# Patient Record
Sex: FEMALE | Race: WHITE | Employment: FULL TIME | URBAN - METROPOLITAN AREA
[De-identification: names, ages, dates, MRNs, and addresses within clinical notes are randomized per-mention and may not be internally consistent; named-entity substitution may affect disease eponyms.]

---

## 2017-01-13 ENCOUNTER — TELEPHONE (OUTPATIENT)
Dept: FAMILY MEDICINE | Facility: CLINIC | Age: 52
End: 2017-01-13

## 2017-01-13 DIAGNOSIS — N89.8 VAGINAL DISCHARGE: Primary | ICD-10-CM

## 2017-01-13 RX ORDER — METRONIDAZOLE 500 MG/1
500 TABLET ORAL 2 TIMES DAILY
Qty: 14 TABLET | Refills: 0 | Status: SHIPPED | OUTPATIENT
Start: 2017-01-13 | End: 2017-05-26

## 2017-01-13 NOTE — TELEPHONE ENCOUNTER
Pt called and left msg that was unclear, states that she doesn't think her infection went away and thinks she needs med's again, please call to triage. She did not state what kind of infection .     Huma Earl, [place]

## 2017-01-13 NOTE — TELEPHONE ENCOUNTER
"Spoke with patient regarding symptoms.   Was treated on 12/2/2016 for bacterial vaginosis. Patient states that \"I am still red and sore around my vagina and there is still a little bit of discharge.\" Stated that there was pain with intercourse. Denies fever. Denies pain with urination.  Routing to provider to see if patient should be seen again or if more antibiotics are needed.    Luciana Shipley RN    "

## 2017-05-01 DIAGNOSIS — R94.5 ABNORMAL RESULTS OF LIVER FUNCTION STUDIES: Primary | ICD-10-CM

## 2017-05-02 DIAGNOSIS — B18.2 CHRONIC HEPATITIS C WITHOUT HEPATIC COMA (H): Primary | ICD-10-CM

## 2017-05-15 ENCOUNTER — OFFICE VISIT (OUTPATIENT)
Dept: GASTROENTEROLOGY | Facility: CLINIC | Age: 52
End: 2017-05-15
Attending: NURSE PRACTITIONER
Payer: COMMERCIAL

## 2017-05-15 VITALS
TEMPERATURE: 98.5 F | OXYGEN SATURATION: 98 % | WEIGHT: 155.4 LBS | HEIGHT: 64 IN | DIASTOLIC BLOOD PRESSURE: 66 MMHG | HEART RATE: 57 BPM | BODY MASS INDEX: 26.53 KG/M2 | SYSTOLIC BLOOD PRESSURE: 122 MMHG

## 2017-05-15 DIAGNOSIS — B18.2 CHRONIC HEPATITIS C WITHOUT HEPATIC COMA (H): Primary | ICD-10-CM

## 2017-05-15 DIAGNOSIS — R94.5 ABNORMAL RESULTS OF LIVER FUNCTION STUDIES: ICD-10-CM

## 2017-05-15 DIAGNOSIS — B18.2 CHRONIC HEPATITIS C WITHOUT HEPATIC COMA (H): ICD-10-CM

## 2017-05-15 LAB
ALBUMIN SERPL-MCNC: 3.8 G/DL (ref 3.4–5)
ALP SERPL-CCNC: 147 U/L (ref 40–150)
ALT SERPL W P-5'-P-CCNC: 297 U/L (ref 0–50)
ANION GAP SERPL CALCULATED.3IONS-SCNC: 7 MMOL/L (ref 3–14)
AST SERPL W P-5'-P-CCNC: 201 U/L (ref 0–45)
BILIRUB DIRECT SERPL-MCNC: 0.2 MG/DL (ref 0–0.2)
BILIRUB SERPL-MCNC: 0.6 MG/DL (ref 0.2–1.3)
BUN SERPL-MCNC: 18 MG/DL (ref 7–30)
CALCIUM SERPL-MCNC: 8.9 MG/DL (ref 8.5–10.1)
CHLORIDE SERPL-SCNC: 105 MMOL/L (ref 94–109)
CO2 SERPL-SCNC: 27 MMOL/L (ref 20–32)
CREAT SERPL-MCNC: 0.61 MG/DL (ref 0.52–1.04)
ERYTHROCYTE [DISTWIDTH] IN BLOOD BY AUTOMATED COUNT: 12.4 % (ref 10–15)
FERRITIN SERPL-MCNC: 711 NG/ML (ref 8–252)
GFR SERPL CREATININE-BSD FRML MDRD: NORMAL ML/MIN/1.7M2
GLUCOSE SERPL-MCNC: 90 MG/DL (ref 70–99)
HCT VFR BLD AUTO: 41.2 % (ref 35–47)
HGB BLD-MCNC: 14 G/DL (ref 11.7–15.7)
INR PPP: 1.01 (ref 0.86–1.14)
IRON SATN MFR SERPL: 32 % (ref 15–46)
IRON SERPL-MCNC: 140 UG/DL (ref 35–180)
MCH RBC QN AUTO: 33.8 PG (ref 26.5–33)
MCHC RBC AUTO-ENTMCNC: 34 G/DL (ref 31.5–36.5)
MCV RBC AUTO: 100 FL (ref 78–100)
PLATELET # BLD AUTO: 75 10E9/L (ref 150–450)
POTASSIUM SERPL-SCNC: 3.9 MMOL/L (ref 3.4–5.3)
PROT SERPL-MCNC: 7 G/DL (ref 6.8–8.8)
RBC # BLD AUTO: 4.14 10E12/L (ref 3.8–5.2)
SODIUM SERPL-SCNC: 139 MMOL/L (ref 133–144)
TIBC SERPL-MCNC: 430 UG/DL (ref 240–430)
WBC # BLD AUTO: 4 10E9/L (ref 4–11)

## 2017-05-15 PROCEDURE — 82728 ASSAY OF FERRITIN: CPT | Performed by: NURSE PRACTITIONER

## 2017-05-15 PROCEDURE — 80076 HEPATIC FUNCTION PANEL: CPT | Performed by: NURSE PRACTITIONER

## 2017-05-15 PROCEDURE — 91200 LIVER ELASTOGRAPHY: CPT | Mod: ZF

## 2017-05-15 PROCEDURE — 36415 COLL VENOUS BLD VENIPUNCTURE: CPT | Performed by: NURSE PRACTITIONER

## 2017-05-15 PROCEDURE — 80048 BASIC METABOLIC PNL TOTAL CA: CPT | Performed by: NURSE PRACTITIONER

## 2017-05-15 PROCEDURE — 83540 ASSAY OF IRON: CPT | Performed by: NURSE PRACTITIONER

## 2017-05-15 PROCEDURE — 99212 OFFICE O/P EST SF 10 MIN: CPT | Mod: ZF

## 2017-05-15 PROCEDURE — 85610 PROTHROMBIN TIME: CPT | Performed by: NURSE PRACTITIONER

## 2017-05-15 PROCEDURE — 85027 COMPLETE CBC AUTOMATED: CPT | Performed by: NURSE PRACTITIONER

## 2017-05-15 PROCEDURE — 83550 IRON BINDING TEST: CPT | Performed by: NURSE PRACTITIONER

## 2017-05-15 ASSESSMENT — PAIN SCALES - GENERAL: PAINLEVEL: NO PAIN (0)

## 2017-05-15 NOTE — MR AVS SNAPSHOT
After Visit Summary   5/15/2017    Parisa Wong    MRN: [id number]           Patient Information     Date Of Birth          1965        Visit Information        Provider Department      5/15/2017 1:00 PM Ganesh Wright NP [facility]        Today's Diagnoses     Chronic hepatitis C without hepatic coma (H)    -  1      Care Instructions    Keep up the excellent lifestyle changes as you are.    Abdominal ultrasound in 1-2 months.    Follow up appointment with Dr So to review lower platelet count and discuss Hepatitis C treatment options (Zepatier and Sofosbuvir x 12 weeks?).    If you have any questions or concerns about your last test results or plan of care, please call our clinic at [phone].           Follow-ups after your visit        Your next 10 appointments already scheduled     Jun 21, 2017 10:30 AM CDT   US ABDOMEN COMPLETE with UCUS2   [facility] ([facility])    [address] [place] [address] [phone]           Please bring a list of your medicines (including vitamins, minerals and over-the-counter drugs). Also, tell your doctor about any allergies you may have. Wear comfortable clothes and leave your valuables at home.  Adults: No eating or drinking for 8 hours before the exam. You may take medicine with a small sip of water.  Children: - Children 6+ years: No food or drink for 6 hours before exam. - Children 1-5 years: No food or drink for 4 hours before exam. - Infants, breast-fed: may have breast milk up to 2 hours before exam. - Infants, formula: may have bottle until 4 hours before exam.  Please call the Imaging Department at your exam site with any questions.            Aug 07, 2017  1:00 PM CDT   Lab with  LAB   [facility] ([facility])    [address] [address] [phone]            Aug 07, 2017  2:00 PM CDT  "  (Arrive by 1:45 PM)   Return General Liver with David So MD   [facility] ([facility])    [address] [facility]  3rd Floor  [facility] [address] [phone]              Future tests that were ordered for you today     Open Future Orders        Priority Expected Expires Ordered    US abdomen complete Routine 8/13/2017 5/15/2018 5/15/2017            Who to contact     If you have questions or need follow up information about today's clinic visit or your schedule please contact [facility] directly at [phone].  Normal or non-critical lab and imaging results will be communicated to you by MyChart, letter or phone within 4 business days after the clinic has received the results. If you do not hear from us within 7 days, please contact the clinic through [employer] or phone. If you have a critical or abnormal lab result, we will notify you by phone as soon as possible.  Submit refill requests through [employer] or call your pharmacy and they will forward the refill request to us. Please allow 3 business days for your refill to be completed.          Additional Information About Your Visit        MyChart Information     [employer] gives you secure access to your electronic health record. If you see a primary care provider, you can also send messages to your care team and make appointments. If you have questions, please call your primary care clinic.  If you do not have a primary care provider, please call [phone] and they will assist you.        Care EveryWhere ID     This is your Care EveryWhere ID. This could be used by other organizations to access your [place] medical records  [id number]        Your Vitals Were     Pulse Temperature Height Pulse Oximetry BMI (Body Mass Index)       57 98.5  F (36.9  C) (Oral) 1.613 m (5' 3.5\") 98% 27.1 kg/m2        Blood Pressure from Last 3 Encounters:   05/15/17 122/66   12/02/16 122/70   11/15/16 128/87    Weight from Last 3 " Encounters:   05/15/17 70.5 kg (155 lb 6.4 oz)   12/02/16 74.1 kg (163 lb 6.4 oz)   11/15/16 77.1 kg (170 lb)               Primary Care Provider Office Phone # Fax #    Imani Bah RamosNELIA Fitzgerald VIGNESH [phone]       [address] [address]        Thank you!     Thank you for choosing [facility]  for your care. Our goal is always to provide you with excellent care. Hearing back from our patients is one way we can continue to improve our services. Please take a few minutes to complete the written survey that you may receive in the mail after your visit with us. Thank you!             Your Updated Medication List - Protect others around you: Learn how to safely use, store and throw away your medicines at www.disposemymeds.org.          This list is accurate as of: 5/15/17  2:05 PM.  Always use your most recent med list.                   Brand Name Dispense Instructions for use    ALEVE PO      1 TABLET EVERY 12 HOURS AS NEEDED       ibuprofen 200 MG tablet    ADVIL/MOTRIN     Take 200 mg by mouth every 4 hours as needed.       menthol (Topical Analgesic) 2.5% 2.5 % Gel topical gel    BENGAY VANISHIN SCENT     Apply topically every 6 hours as needed for moderate pain       methyl salicylate liquid      Apply topically as needed for moderate to severe pain (Max Freeze)       metroNIDAZOLE 500 MG tablet    FLAGYL    14 tablet    Take 1 tablet (500 mg) by mouth 2 times daily       * UNABLE TO FIND      MEDICATION NAME: Iodine, 3 drops on tongue once per day       * UNABLE TO FIND      MEDICATION NAME: Diatomaceous       * Notice:  This list has 2 medication(s) that are the same as other medications prescribed for you. Read the directions carefully, and ask your doctor or other care provider to review them with you.

## 2017-05-15 NOTE — LETTER
5/15/2017      RE: Parisa Wong  [address] [address] [facility] [address]       REASON FOR VISIT:  Followup hepatitis C infection.      HISTORY OF PRESENT ILLNESS:  Ms. Parisa Bullard is a 52-year-old  female who has a history of chronic hepatitis C infection.  She does have hepatitis C genotype 3A.  She was previously seen and followed at [facility].  Of note, most recently, she has established care with my colleague, Ms. Radha Thompson.  This is my first time meeting her.  Of note, she does indicate that she did do interferon 3 times weekly back the 1990s (95-99).  She does not think that she got any ribavirin back then.  According to Ms. Thompson's notes, she was a partial responder and did not have an undetectable virus level at the end of treatment.  She did undergo a liver biopsy in 2006 which showed stage 0-1 fibrosis.  No excess iron was noted.  She did undergo an MRE elastography study in 2006 which showed stage III to IV fibrosis.  She had been not 100% abstinent from alcohol and was advised to do so and return to the Liver Clinic in 6 months to discuss hepatitis C treatment.  This is what brings her in to clinic today.  She is pleased to report she has not had any alcohol since 11/14/2016.  It has not been difficult.  She was not drinking that much alcohol before then, per her report.  She does not have any problems with abdominal pain.  She occasionally has nausea but no vomiting.  She does not have any problems with heartburn.  Her appetite is decreased.  She has been working on trying to lose some weight and is pleased to report that she has been able to do so with the use of spices, as well as using iodine tablets.  She does not have any problems with diarrhea or constipation.  She indicates that just today she noticed a little bit of bright red blood in the stool.  She thinks she has hemorrhoids and has had in the past.  She was having a little more diarrhea today.   Most of the time, she has regular bowel movements.  She did undergo a colonoscopy in 2013 where several tubular adenomas were noted.  Per her report, she has to have a repeat colonoscopy 5 years later (2018).  She indicates she is not sleeping especially well.  She only gets around 4 hours of sleep at night.  Her energy level is fair.  She does not have any problems with depression but feels a little bit of memory/forgetfulness.  She thinks it might be related to not getting a very good quality of sleep.  She has been screened and is negative for previous hepatitis B infection.  She is HIV negative.  She does not currently have immunity to hepatitis B.  She has completed vaccinations for both hepatitis A and B in the past.  Both of her titers, however, are not showing current immunity.      PAST MEDICAL HEALTH HISTORY:  Significant for hepatitis C infection, report of hemorrhoids, colon polyps, back herniated disk, concussion.      PAST SURGICAL HISTORY:  Includes a back bone chip removal, back cyst removal, tonsillectomy.      SOCIAL HISTORY:  She is .  She has 2 children.  She does smoke.        FAMILY HEALTH HISTORY:  Her mother is living.  She has leukemia/uterine cancer.  She possibly might also have Crohn's disease.  She did not know her father.  She has 4 sisters and 1 brother.  She does not think they have any liver or GI issues, but she does indicate that some remote family member did have hemochromatosis H63D.  She has had iron tests, and these have been normal.  She  also did not have any iron noted on her liver biopsy.      ALLERGIES AND MEDICATIONS:  Listed in the electronic medical record.      PHYSICAL EXAMINATION:   GENERAL:  Ms. Wong is a pleasant middle-aged  female in no apparent distress.   VITAL SIGNS:  Blood pressure 122/66, heart rate 57 beats minute, temperature 98.5 orally, O2 sat 98% on room air, weight 155 pounds, BMI 27.   HEENT:  Anicteric sclerae.  Oropharynx is  clear.   NECK:  Supple, no lymphadenopathy.   PULMONARY:  Clear to auscultation.  No crackles or rales noted.   CARDIOVASCULAR:  S1, S2, regular rate and rhythm, no murmur or gallop appreciated.   ABDOMEN:  Soft, rounded, nontender to palpation.  No spleen tip palpable.  Liver size estimated at 1 fingerbreadth below the right costal margin.   EXTREMITIES:  Shows no clubbing, cyanosis or pitting edema.   NEUROLOGIC:  Grossly intact.   PSYCHIATRIC:  Normal speech, normal affect, normal memory.      LABORATORY:  Most recent laboratory tests results from today showed the following:  White count 4, hemoglobin 14, platelet count 75,000.  INR 1.05.  , , alkaline phosphatase 147, albumin 3.8, total bilirubin 0.6.  Electrolyte panel entirely normal.  Fibrosis scan from today shows it to be 8.9, which puts her in the early stage III fibrosis range.      ASSESSMENT:  Chronic hepatitis C, stage III fibrosis, thrombocytopenia.      PLAN:  At this time, I spent time talking with Ms. Wong about her current liver fibrosis, abnormal labs and hepatitis C retreatment options.  I do find it a little surprising to see that she has such significant thrombocytopenia with her stage III fibrosis.  The rest of her liver labs do not support a cirrhotic-appearing liver, and there is no mention of significant splenomegaly on imaging (spleen size measured 13.3 cm).  She might benefit from a review by Hematology.  I did talk with her about the newer hepatitis C medications.  She does have a genotype 3A.  I would treat her as though she has cirrhosis with her stage III fibrosis and lower platelet count.  We reviewed that a new hepatitis C treatment guidelines and see that she might be a candidate for zepatier and Epclusa once daily for 12 weeks.  She would prefer this to Epclusa and ribavirin, as she does not like to take a lot of pills.  I did let her know that I am leaving our practice on 06/09.  I would like to slow down  slightly and ask her to establish care with my colleague, Dr. David So, to review her thrombocytopenia (address the question of whether or not she would benefit from seeing Hematology, as it does seem slightly more abnormal than would be expected given her other labs and imaging).  I would also evaluate his input on her previous treatment experience with  Interferon 3 times weekly interferon minus ribavirin.  The current hepatitis C guidelines address previous pegylated interferon experience, and I am not 100% certain that that would also apply to some of the earlier interferon options and would value his input and thoughts on this for her.  I have added on iron tests to make sure that they are normal given her remote family history of hemochromatosis.  At this time, I have not advised additional vaccinations for hepatitis A and B, but she might benefit from redoing both series jointly but at a higher dose in the future.  I have asked her again to establish care with my colleague, Dr. So, to take over her liver care and hepatitis C retreatment plan.  She was congratulated on her commitment to sobriety.  I did advise that she complete an up-to-date abdominal ultrasound sometime in the next 1-2 months for HCC screening.  We did review the fact that stage III fibrosis individuals can get an HCC.  All of her questions were addressed.        Forty minutes were spent with this patient, greater than 50% spent in counseling and coordination of care.      cc: NELIA Mccabe, VIGNESH Wright NP

## 2017-05-15 NOTE — PATIENT INSTRUCTIONS
Keep up the excellent lifestyle changes as you are.    Abdominal ultrasound in 1-2 months.    Follow up appointment with Dr So to review lower platelet count and discuss Hepatitis C treatment options (Zepatier and Sofosbuvir x 12 weeks?).    If you have any questions or concerns about your last test results or plan of care, please call our clinic at [phone].

## 2017-05-15 NOTE — PROGRESS NOTES
REASON FOR VISIT:  Followup hepatitis C infection.      HISTORY OF PRESENT ILLNESS:  Ms. Parisa Bullard is a 52-year-old  female who has a history of chronic hepatitis C infection.  She does have hepatitis C genotype 3A.  She was previously seen and followed at [facility].  Of note, most recently, she has established care with my colleague, Ms. Radha Thompson.  This is my first time meeting her.  Of note, she does indicate that she did do interferon 3 times weekly back the 1990s (95-99).  She does not think that she got any ribavirin back then.  According to Ms. Thompson's notes, she was a partial responder and did not have an undetectable virus level at the end of treatment.  She did undergo a liver biopsy in 2006 which showed stage 0-1 fibrosis.  No excess iron was noted.  She did undergo an MRE elastography study in 2006 which showed stage III to IV fibrosis.  She had been not 100% abstinent from alcohol and was advised to do so and return to the Liver Clinic in 6 months to discuss hepatitis C treatment.  This is what brings her in to clinic today.  She is pleased to report she has not had any alcohol since 11/14/2016.  It has not been difficult.  She was not drinking that much alcohol before then, per her report.  She does not have any problems with abdominal pain.  She occasionally has nausea but no vomiting.  She does not have any problems with heartburn.  Her appetite is decreased.  She has been working on trying to lose some weight and is pleased to report that she has been able to do so with the use of spices, as well as using iodine tablets.  She does not have any problems with diarrhea or constipation.  She indicates that just today she noticed a little bit of bright red blood in the stool.  She thinks she has hemorrhoids and has had in the past.  She was having a little more diarrhea today.  Most of the time, she has regular bowel movements.  She did undergo a colonoscopy  in 2013 where several tubular adenomas were noted.  Per her report, she has to have a repeat colonoscopy 5 years later (2018).  She indicates she is not sleeping especially well.  She only gets around 4 hours of sleep at night.  Her energy level is fair.  She does not have any problems with depression but feels a little bit of memory/forgetfulness.  She thinks it might be related to not getting a very good quality of sleep.  She has been screened and is negative for previous hepatitis B infection.  She is HIV negative.  She does not currently have immunity to hepatitis B.  She has completed vaccinations for both hepatitis A and B in the past.  Both of her titers, however, are not showing current immunity.      PAST MEDICAL HEALTH HISTORY:  Significant for hepatitis C infection, report of hemorrhoids, colon polyps, back herniated disk, concussion.      PAST SURGICAL HISTORY:  Includes a back bone chip removal, back cyst removal, tonsillectomy.      SOCIAL HISTORY:  She is .  She has 2 children.  She does smoke.        FAMILY HEALTH HISTORY:  Her mother is living.  She has leukemia/uterine cancer.  She possibly might also have Crohn's disease.  She did not know her father.  She has 4 sisters and 1 brother.  She does not think they have any liver or GI issues, but she does indicate that some remote family member did have hemochromatosis H63D.  She has had iron tests, and these have been normal.  She  also did not have any iron noted on her liver biopsy.      ALLERGIES AND MEDICATIONS:  Listed in the electronic medical record.      PHYSICAL EXAMINATION:   GENERAL:  Ms. Wong is a pleasant middle-aged  female in no apparent distress.   VITAL SIGNS:  Blood pressure 122/66, heart rate 57 beats minute, temperature 98.5 orally, O2 sat 98% on room air, weight 155 pounds, BMI 27.   HEENT:  Anicteric sclerae.  Oropharynx is clear.   NECK:  Supple, no lymphadenopathy.   PULMONARY:  Clear to auscultation.  No  crackles or rales noted.   CARDIOVASCULAR:  S1, S2, regular rate and rhythm, no murmur or gallop appreciated.   ABDOMEN:  Soft, rounded, nontender to palpation.  No spleen tip palpable.  Liver size estimated at 1 fingerbreadth below the right costal margin.   EXTREMITIES:  Shows no clubbing, cyanosis or pitting edema.   NEUROLOGIC:  Grossly intact.   PSYCHIATRIC:  Normal speech, normal affect, normal memory.      LABORATORY:  Most recent laboratory tests results from today showed the following:  White count 4, hemoglobin 14, platelet count 75,000.  INR 1.05.  , , alkaline phosphatase 147, albumin 3.8, total bilirubin 0.6.  Electrolyte panel entirely normal.  Fibrosis scan from today shows it to be 8.9, which puts her in the early stage III fibrosis range.      ASSESSMENT:  Chronic hepatitis C, stage III fibrosis, thrombocytopenia.      PLAN:  At this time, I spent time talking with Ms. Wong about her current liver fibrosis, abnormal labs and hepatitis C retreatment options.  I do find it a little surprising to see that she has such significant thrombocytopenia with her stage III fibrosis.  The rest of her liver labs do not support a cirrhotic-appearing liver, and there is no mention of significant splenomegaly on imaging (spleen size measured 13.3 cm).  She might benefit from a review by Hematology.  I did talk with her about the newer hepatitis C medications.  She does have a genotype 3A.  I would treat her as though she has cirrhosis with her stage III fibrosis and lower platelet count.  We reviewed that a new hepatitis C treatment guidelines and see that she might be a candidate for zepatier and Epclusa once daily for 12 weeks.  She would prefer this to Epclusa and ribavirin, as she does not like to take a lot of pills.  I did let her know that I am leaving our practice on 06/09.  I would like to slow down slightly and ask her to establish care with my colleague, Dr. David So, to review her  thrombocytopenia (address the question of whether or not she would benefit from seeing Hematology, as it does seem slightly more abnormal than would be expected given her other labs and imaging).  I would also evaluate his input on her previous treatment experience with  Interferon 3 times weekly interferon minus ribavirin.  The current hepatitis C guidelines address previous pegylated interferon experience, and I am not 100% certain that that would also apply to some of the earlier interferon options and would value his input and thoughts on this for her.  I have added on iron tests to make sure that they are normal given her remote family history of hemochromatosis.  At this time, I have not advised additional vaccinations for hepatitis A and B, but she might benefit from redoing both series jointly but at a higher dose in the future.  I have asked her again to establish care with my colleague, Dr. So, to take over her liver care and hepatitis C retreatment plan.  She was congratulated on her commitment to sobriety.  I did advise that she complete an up-to-date abdominal ultrasound sometime in the next 1-2 months for HCC screening.  We did review the fact that stage III fibrosis individuals can get an HCC.  All of her questions were addressed.        Forty minutes were spent with this patient, greater than 50% spent in counseling and coordination of care.      cc: NELIA Mccabe, CNP

## 2017-05-15 NOTE — NURSING NOTE
Chief Complaint   Patient presents with     RECHECK     Hepatitis C   Pt roomed, vitals, meds, and allergies reviewed with pt. Pt ready for provider.  Aryan Holley, CMA

## 2017-05-26 ENCOUNTER — OFFICE VISIT (OUTPATIENT)
Dept: FAMILY MEDICINE | Facility: CLINIC | Age: 52
End: 2017-05-26
Payer: COMMERCIAL

## 2017-05-26 VITALS
SYSTOLIC BLOOD PRESSURE: 124 MMHG | HEART RATE: 64 BPM | HEIGHT: 64 IN | DIASTOLIC BLOOD PRESSURE: 80 MMHG | WEIGHT: 151 LBS | BODY MASS INDEX: 25.78 KG/M2 | TEMPERATURE: 98.9 F

## 2017-05-26 DIAGNOSIS — R74.8 ELEVATED LIVER ENZYMES: ICD-10-CM

## 2017-05-26 DIAGNOSIS — B96.89 BV (BACTERIAL VAGINOSIS): ICD-10-CM

## 2017-05-26 DIAGNOSIS — N76.0 BV (BACTERIAL VAGINOSIS): ICD-10-CM

## 2017-05-26 DIAGNOSIS — N89.8 VAGINAL DISCHARGE: Primary | ICD-10-CM

## 2017-05-26 LAB
MICRO REPORT STATUS: ABNORMAL
SPECIMEN SOURCE: ABNORMAL
WET PREP SPEC: ABNORMAL

## 2017-05-26 PROCEDURE — 99213 OFFICE O/P EST LOW 20 MIN: CPT | Performed by: NURSE PRACTITIONER

## 2017-05-26 PROCEDURE — 87491 CHLMYD TRACH DNA AMP PROBE: CPT | Performed by: NURSE PRACTITIONER

## 2017-05-26 PROCEDURE — 87591 N.GONORRHOEAE DNA AMP PROB: CPT | Performed by: NURSE PRACTITIONER

## 2017-05-26 PROCEDURE — 87210 SMEAR WET MOUNT SALINE/INK: CPT | Performed by: NURSE PRACTITIONER

## 2017-05-26 RX ORDER — METRONIDAZOLE 500 MG/1
500 TABLET ORAL 2 TIMES DAILY
Qty: 42 TABLET | Refills: 0 | Status: SHIPPED | OUTPATIENT
Start: 2017-05-26 | End: 2017-07-18

## 2017-05-26 NOTE — NURSING NOTE
"Chief Complaint   Patient presents with     Liver       Initial /80  Pulse 64  Temp 98.9  F (37.2  C) (Tympanic)  Ht 5' 3.5\" (1.613 m)  Wt 151 lb (68.5 kg)  BMI 26.33 kg/m2 Estimated body mass index is 26.33 kg/(m^2) as calculated from the following:    Height as of this encounter: 5' 3.5\" (1.613 m).    Weight as of this encounter: 151 lb (68.5 kg).  Medication Reconciliation: complete  "

## 2017-05-26 NOTE — PATIENT INSTRUCTIONS
You do have a bacterial vaginosis   Please continue with oral vinegar    Get a 1 time  Vinegar vaginal douche      use the Flagyl times 1 week off for 2 then repeat x2

## 2017-05-26 NOTE — MR AVS SNAPSHOT
After Visit Summary   5/26/2017    Parisa Wong    MRN: [id number]           Patient Information     Date Of Birth          1965        Visit Information        Provider Department      5/26/2017 7:20 AM Imani Puentes APRN [facility]        Today's Diagnoses     Vaginal discharge    -  1    Elevated liver enzymes        BV (bacterial vaginosis)          Care Instructions    You do have a bacterial vaginosis   Please continue with oral vinegar    Get a 1 time  Vinegar vaginal douche      use the Flagyl times 1 week off for 2 then repeat x2             Follow-ups after your visit        Your next 10 appointments already scheduled     Jun 21, 2017 10:30 AM CDT   US ABDOMEN COMPLETE with US[address] [place] [address] Imaging Center US ([facility])    [address] [place] [address] [phone]           Please bring a list of your medicines (including vitamins, minerals and over-the-counter drugs). Also, tell your doctor about any allergies you may have. Wear comfortable clothes and leave your valuables at home.  Adults: No eating or drinking for 8 hours before the exam. You may take medicine with a small sip of water.  Children: - Children 6+ years: No food or drink for 6 hours before exam. - Children 1-5 years: No food or drink for 4 hours before exam. - Infants, breast-fed: may have breast milk up to 2 hours before exam. - Infants, formula: may have bottle until 4 hours before exam.  Please call the Imaging Department at your exam site with any questions.            Aug 07, 2017  1:00 PM CDT   Lab with  LAB   [facility] ([facility])    [address] [place] [address] [phone]            Aug 07, 2017  2:00 PM CDT   (Arrive by 1:45 PM)   Return General Liver with David So MD   [facility] Hepatology ([facility])    [address] [place] [address]  "  3rd [facility] [id number] [phone]              Who to contact     Normal or non-critical lab and imaging results will be communicated to you by [employer], letter or phone within 4 business days after the clinic has received the results. If you do not hear from us within 7 days, please contact the clinic through [employer] or phone. If you have a critical or abnormal lab result, we will notify you by phone as soon as possible.  Submit refill requests through [employer] or call your pharmacy and they will forward the refill request to us. Please allow 3 business days for your refill to be completed.          If you need to speak with a  for additional information , please call: [phone]           Additional Information About Your Visit        [employer] Information     [employer] gives you secure access to your electronic health record. If you see a primary care provider, you can also send messages to your care team and make appointments. If you have questions, please call your primary care clinic.  If you do not have a primary care provider, please call [phone] and they will assist you.        Care EveryWhere ID     This is your Care EveryWhere ID. This could be used by other organizations to access your [place] medical records  [id number]        Your Vitals Were     Pulse Temperature Height BMI (Body Mass Index)          64 98.9  F (37.2  C) (Tympanic) 5' 3.5\" (1.613 m) 26.33 kg/m2         Blood Pressure from Last 3 Encounters:   05/26/17 124/80   05/15/17 122/66   12/02/16 122/70    Weight from Last 3 Encounters:   05/26/17 151 lb (68.5 kg)   05/15/17 155 lb 6.4 oz (70.5 kg)   12/02/16 163 lb 6.4 oz (74.1 kg)              We Performed the Following     Chlamydia trachomatis PCR     Neisseria gonorrhoeae PCR     Wet prep          Today's Medication Changes          These changes are accurate as of: 5/26/17  8:37 AM.  If you have any questions, ask your nurse or doctor.          "      These medicines have changed or have updated prescriptions.        Dose/Directions    metroNIDAZOLE 500 MG tablet   Commonly known as:  FLAGYL   This may have changed:  additional instructions   Used for:  Vaginal discharge, BV (bacterial vaginosis)   Changed by:  Imani Puentes APRN CNP        Dose:  500 mg   Take 1 tablet (500 mg) by mouth 2 times daily Take for  7 days off for 2 weeks and repeat. x2   Quantity:  42 tablet   Refills:  0         Stop taking these medicines if you haven't already. Please contact your care team if you have questions.     ALEVE PO   Stopped by:  Imani Puentes APRN CNP           ibuprofen 200 MG tablet   Commonly known as:  ADVIL/MOTRIN   Stopped by:  Imani Puentes APRN CNP           menthol (Topical Analgesic) 2.5% 2.5 % Gel topical gel   Commonly known as:  BENGAY VANISHIN SCENT   Stopped by:  Imani Puentes APRN CNP                Where to get your medicines      These medications were sent to [address] [address]     Phone:  [phone]     metroNIDAZOLE 500 MG tablet                Primary Care Provider Office Phone # Fax #    NELIA Hugo [phone]       [address] [address]        Thank you!     Thank you for choosing [facility]  for your care. Our goal is always to provide you with excellent care. Hearing back from our patients is one way we can continue to improve our services. Please take a few minutes to complete the written survey that you may receive in the mail after your visit with us. Thank you!             Your Updated Medication List - Protect others around you: Learn how to safely use, store and throw away your medicines at www.disposemymeds.org.          This list is accurate as of: 5/26/17  8:37 AM.  Always use your most recent med list.                    Brand Name Dispense Instructions for use    methyl salicylate liquid      Apply topically as needed for moderate to severe pain (Max Freeze)       metroNIDAZOLE 500 MG tablet    FLAGYL    42 tablet    Take 1 tablet (500 mg) by mouth 2 times daily Take for  7 days off for 2 weeks and repeat. x2       * UNABLE TO FIND      MEDICATION NAME: Iodine, 3 drops on tongue once per day       * UNABLE TO FIND      MEDICATION NAME: Diatomaceous       * Notice:  This list has 2 medication(s) that are the same as other medications prescribed for you. Read the directions carefully, and ask your doctor or other care provider to review them with you.

## 2017-05-26 NOTE — PROGRESS NOTES
SUBJECTIVE:                                                    Parisa Wong is a 52 year old female who presents to clinic today for the following health issues:    - Review labs completed 5/15/2017. PMHx chronic hepatitis C without coma.    - Check skin.    Vaginal Symptoms     Onset: 1 year    Description:  Vaginal Discharge: white   Itching (Pruritis): YES- along elastic underwear line  Burning sensation:  no   Odor: no     Accompanying Signs & Symptoms:  Pain with Urination: no   Abdominal Pain: no   Fever: no    History:   Sexually active: no   New Partner: no   Possibility of Pregnancy:  No    Precipitating factors:   Recent Antibiotic Use: no     Alleviating factors:  None   Therapies Tried and outcome: None    Is doing great  !!! After drinking her coffee with  Cinnamon, nutmeg, turmeric coconut,  Tumeric  And she is having NO pain. And with the weight loss she si feeling better.   She quit drinking as well.      Problem list and histories reviewed & adjusted, as indicated.  Additional history: as documented    Patient Active Problem List   Diagnosis     GOITER UNINODULAR, NONTOXIC     ENLARGED UTERUS     CARDIOVASCULAR SCREENING; LDL GOAL LESS THAN 160     24 hour contact handout given     Memory difficulty     Elevated liver enzymes     Vitamin D deficiency     Colon polyps     Obese     Knee pain     SI (sacroiliac) joint inflammation (H)     Lumbar spinal stenosis     Thumb pain     Chronic hepatitis C without hepatic coma (H)     Degenerative arthritis of thumb     Past Surgical History:   Procedure Laterality Date     COLONOSCOPY  1/29/2014    Procedure: COMBINED COLONOSCOPY, SINGLE BIOPSY/POLYPECTOMY BY BIOPSY;  Gastroscopy;  Surgeon: Prashanth Lin MD;  Location: [facility]     D & C  6-     HC ABLATION, ENDOMETRIAL, THERMAL, W/O HYSTEROSCOPIC GUIDANCE  6-    novasure     SURGICAL HISTORY OF -   1979    tonsillectomy and adenoidectomy     SURGICAL HISTORY OF -       discectomy  L5, S1       Social History   Substance Use Topics     Smoking status: Former Smoker     Types: Cigars     Smokeless tobacco: Never Used      Comment: son and huband smoke in the house     Alcohol use 2.4 - 3.0 oz/week     4 - 5 Cans of beer per week      Comment: none since november     Family History   Problem Relation Age of Onset     CANCER Maternal Grandmother      throat     Alcohol/Drug Maternal Grandfather      alcohol     Neurologic Disorder Daughter      cerebral palsy     GASTROINTESTINAL DISEASE Mother      Divertic     Gynecology Mother      Hysterectomy     CANCER Mother      Leukemia     Breast Cancer Maternal Aunt      Unknown/Adopted Father      Cancer - colorectal No family hx of      Prostate Cancer No family hx of      C.A.D. No family hx of          Current Outpatient Prescriptions   Medication Sig Dispense Refill     UNABLE TO FIND MEDICATION NAME: Diatomaceous       UNABLE TO FIND MEDICATION NAME: Iodine, 3 drops on tongue once per day       methyl salicylate liquid Apply topically as needed for moderate to severe pain (Max Freeze)        Allergies   Allergen Reactions     Codeine      Sulfa Drugs      BP Readings from Last 3 Encounters:   05/26/17 124/80   05/15/17 122/66   12/02/16 122/70    Wt Readings from Last 3 Encounters:   05/26/17 151 lb (68.5 kg)   05/15/17 155 lb 6.4 oz (70.5 kg)   12/02/16 163 lb 6.4 oz (74.1 kg)                    Reviewed and updated as needed this visit by clinical staff       Reviewed and updated as needed this visit by Provider         ROS:  CONSTITUTIONAL:POSITIVE  for weight loss with diet changes, quit drinking ,  See note above.   E/M: NEGATIVE for ear, mouth and throat problems  R: NEGATIVE for significant cough or SOB  CV: NEGATIVE for chest pain, palpitations or peripheral edema  GI: NEGATIVE for nausea, abdominal pain, heartburn, or change in bowel habits  : vaginal discharge for 1 year.  Will have intermittent itching,    PSYCHIATRIC: NEGATIVE for  "changes in mood or affect    OBJECTIVE:                                                    /80  Pulse 64  Temp 98.9  F (37.2  C) (Tympanic)  Ht 5' 3.5\" (1.613 m)  Wt 151 lb (68.5 kg)  BMI 26.33 kg/m2  Body mass index is 26.33 kg/(m^2).  GENERAL: healthy, alert and no distress  NECK: no adenopathy, no asymmetry, masses, or scars and thyroid normal to palpation  RESP: lungs clear to auscultation - no rales, rhonchi or wheezes  CV: regular rate and rhythm, normal S1 S2, no S3 or S4, no murmur, click or rub, no peripheral edema and peripheral pulses strong  ABDOMEN: soft, nontender, no hepatosplenomegaly, no masses and bowel sounds normal   there is normal appearing vaginal discharge.   MS: no gross musculoskeletal defects noted, no edema    Diagnostic Test Results:  Results for orders placed or performed in visit on 05/26/17 (from the past 24 hour(s))   Wet prep   Result Value Ref Range    Specimen Description Vagina     Wet Prep (A)      Many Clue cells seen  No yeast seen  No Trichomonas seen  Many WBC'S seen      Micro Report Status FINAL 05/26/2017         ASSESSMENT/PLAN:                                                         ASSESSMENT/PLAN:      ICD-10-CM    1. Vaginal discharge N89.8 Wet prep     Neisseria gonorrhoeae PCR     Chlamydia trachomatis PCR     metroNIDAZOLE (FLAGYL) 500 MG tablet   2. Elevated liver enzymes R74.8    3. BV (bacterial vaginosis) N76.0 metroNIDAZOLE (FLAGYL) 500 MG tablet    B96.89        Patient Instructions   You do have a bacterial vaginosis   Please continue with oral vinegar    Get a 1 time  Vinegar vaginal douche      use the Flagyl times 1 week off for 2 then repeat x2                   MEDICATIONS:        - Start taking Flagyl       - Continue other medications without change  See Patient Instructions    ANGE CARPENTER NP, APRN [facility]    "

## 2017-05-28 LAB
C TRACH DNA SPEC QL NAA+PROBE: NORMAL
N GONORRHOEA DNA SPEC QL NAA+PROBE: NORMAL
SPECIMEN SOURCE: NORMAL
SPECIMEN SOURCE: NORMAL

## 2017-06-06 DIAGNOSIS — B18.2 CHRONIC HEPATITIS C WITHOUT HEPATIC COMA (H): Primary | ICD-10-CM

## 2017-06-22 ENCOUNTER — TELEPHONE (OUTPATIENT)
Dept: FAMILY MEDICINE | Facility: CLINIC | Age: 52
End: 2017-06-22

## 2017-06-22 NOTE — TELEPHONE ENCOUNTER
Reason for Call: Request for an order or referral:    Order or referral being requested: Blood Work and Tetnus    Date needed: by next week    Has the patient been seen by the PCP for this problem? unsure    Additional comments: She said that H63S runs in her family.  She would like orders for a blood test for this. She sees the specialist on 7/8-18/2017.  She would like a tetanus shot too.  Please advise.    Phone number Patient can be reached at:  Home number on file [phone] (home)    Best Time:  any    Can we leave a detailed message on this number?  YES    Call taken on 6/22/2017 at 9:30 AM by Alexus Eduardo

## 2017-06-26 NOTE — TELEPHONE ENCOUNTER
Could you call her back.  I don't know what H63S is   Who in her family has that diagnosis    It is a genetic test that she would have to sign a form stating that she will pay for the test if her insurance doesn't      What would she do with the information from the test results     She can make a nurse only appointment to update her tetanus or go to the pharmacy

## 2017-06-27 NOTE — TELEPHONE ENCOUNTER
I spoke to Parisa. She said the H63S test is a test to find out if there is a genetic cause for the body not to absorb iron like it should so it attacks the liver and other organs.She says her maternal Grandfather had this gene as well as an Aunt and cousins.    I let her know that in most cases a specialist orders these kinds of tests. She says she has an appointment with one in July and wanted to have the results for him when she goes. Do you want him to send orders for any tests she might need or are you ok with ordering it? I let her know she can do the tetanus here or in pharmacy. Haylee Pina RN

## 2017-06-28 NOTE — TELEPHONE ENCOUNTER
The test code H63S doesn't come up on the computer ordering.    She may need to call the specialist to see if they can order it plus the results will then go to them   Thanks   Imani

## 2017-07-18 ENCOUNTER — OFFICE VISIT (OUTPATIENT)
Dept: GASTROENTEROLOGY | Facility: CLINIC | Age: 52
End: 2017-07-18
Attending: INTERNAL MEDICINE
Payer: COMMERCIAL

## 2017-07-18 VITALS
SYSTOLIC BLOOD PRESSURE: 109 MMHG | DIASTOLIC BLOOD PRESSURE: 68 MMHG | TEMPERATURE: 98.2 F | OXYGEN SATURATION: 98 % | HEART RATE: 70 BPM | WEIGHT: 148.4 LBS | BODY MASS INDEX: 25.33 KG/M2 | HEIGHT: 64 IN

## 2017-07-18 DIAGNOSIS — B18.2 CHRONIC HEPATITIS C WITHOUT HEPATIC COMA (H): Primary | ICD-10-CM

## 2017-07-18 DIAGNOSIS — B18.2 CHRONIC HEPATITIS C WITHOUT HEPATIC COMA (H): ICD-10-CM

## 2017-07-18 LAB
ALBUMIN SERPL-MCNC: 3.8 G/DL (ref 3.4–5)
ALP SERPL-CCNC: 154 U/L (ref 40–150)
ALT SERPL W P-5'-P-CCNC: 486 U/L (ref 0–50)
ANION GAP SERPL CALCULATED.3IONS-SCNC: 6 MMOL/L (ref 3–14)
AST SERPL W P-5'-P-CCNC: 407 U/L (ref 0–45)
BILIRUB DIRECT SERPL-MCNC: 0.5 MG/DL (ref 0–0.2)
BILIRUB SERPL-MCNC: 1.1 MG/DL (ref 0.2–1.3)
BUN SERPL-MCNC: 16 MG/DL (ref 7–30)
CALCIUM SERPL-MCNC: 9.2 MG/DL (ref 8.5–10.1)
CHLORIDE SERPL-SCNC: 106 MMOL/L (ref 94–109)
CO2 SERPL-SCNC: 27 MMOL/L (ref 20–32)
CREAT SERPL-MCNC: 0.68 MG/DL (ref 0.52–1.04)
ERYTHROCYTE [DISTWIDTH] IN BLOOD BY AUTOMATED COUNT: 12.3 % (ref 10–15)
GFR SERPL CREATININE-BSD FRML MDRD: NORMAL ML/MIN/1.7M2
GLUCOSE SERPL-MCNC: 95 MG/DL (ref 70–99)
HCT VFR BLD AUTO: 41.5 % (ref 35–47)
HGB BLD-MCNC: 14.4 G/DL (ref 11.7–15.7)
INR PPP: 1.09 (ref 0.86–1.14)
MCH RBC QN AUTO: 34.1 PG (ref 26.5–33)
MCHC RBC AUTO-ENTMCNC: 34.7 G/DL (ref 31.5–36.5)
MCV RBC AUTO: 98 FL (ref 78–100)
PLATELET # BLD AUTO: 60 10E9/L (ref 150–450)
POTASSIUM SERPL-SCNC: 4 MMOL/L (ref 3.4–5.3)
PROT SERPL-MCNC: 7 G/DL (ref 6.8–8.8)
RBC # BLD AUTO: 4.22 10E12/L (ref 3.8–5.2)
SODIUM SERPL-SCNC: 138 MMOL/L (ref 133–144)
WBC # BLD AUTO: 3.3 10E9/L (ref 4–11)

## 2017-07-18 PROCEDURE — 85610 PROTHROMBIN TIME: CPT | Performed by: INTERNAL MEDICINE

## 2017-07-18 PROCEDURE — 80076 HEPATIC FUNCTION PANEL: CPT | Performed by: INTERNAL MEDICINE

## 2017-07-18 PROCEDURE — 80048 BASIC METABOLIC PNL TOTAL CA: CPT | Performed by: INTERNAL MEDICINE

## 2017-07-18 PROCEDURE — 36415 COLL VENOUS BLD VENIPUNCTURE: CPT | Performed by: INTERNAL MEDICINE

## 2017-07-18 PROCEDURE — 99212 OFFICE O/P EST SF 10 MIN: CPT | Mod: ZF

## 2017-07-18 PROCEDURE — 85027 COMPLETE CBC AUTOMATED: CPT | Performed by: INTERNAL MEDICINE

## 2017-07-18 RX ORDER — VELPATASVIR AND SOFOSBUVIR 100; 400 MG/1; MG/1
1 TABLET, FILM COATED ORAL DAILY
Qty: 28 TABLET | Refills: 2 | Status: SHIPPED | OUTPATIENT
Start: 2017-07-18 | End: 2017-11-21

## 2017-07-18 ASSESSMENT — PAIN SCALES - GENERAL: PAINLEVEL: NO PAIN (0)

## 2017-07-18 NOTE — LETTER
7/18/2017      RE: Parisa Wong  [address] [facility] [address]       I had the pleasure of seeing Parisa Wong for follow-up in the clinic at the [facility] on July 18, 2017. This Marvin returns for follow-up of chronic hepatitis C. She has had recent imaging studies which suggest that she has stage III fibrosis.    She states she feels fairly well. She does note some mild right upper quadrant pain that is not related to meals and is somewhat positional. She does note fairly significant itching in the absence of skin rash. She does complain of some fatigue and naps a daily basis.    She denies any fevers or chills, cough or shortness of breath. She denies any nausea or vomiting, diarrhea or constipation. Her appetite has been good.    In terms of her past hepatitis c treatments, she'll was treated with interferon in the 90s and does not believe that she actually received ribavirin.    Current Outpatient Prescriptions   Medication     medical cannabis (Patient's own supply.  Not a prescription)     sofosbuvir-velpatasvir (EPCLUSA) 400-100 MG per tablet     UNABLE TO FIND     UNABLE TO FIND     methyl salicylate liquid     No current facility-administered medications for this visit.      B/P: 109/68, T: 98.2, P: 70, R: Data Unavailable  PHYSICAL EXAMINATION: She appears well.  HEENT examination shows no scleral icterus and no temporal muscle wasting.  Her chest is clear.  Her abdominal examination shows no masses or tenderness to palpation are present.  Her liver and spleen are not palpable.  Extremity examination shows no edema. Skin exam shows no stigmata of chronic pruritus.      Recent Results (from the past 168 hour(s))   Hepatic panel    Collection Time: 07/18/17  7:12 AM   Result Value Ref Range    Bilirubin Direct 0.5 (H) 0.0 - 0.2 mg/dL    Bilirubin Total 1.1 0.2 - 1.3 mg/dL    Albumin 3.8 3.4 - 5.0 g/dL    Protein Total 7.0 6.8 - 8.8 g/dL    Alkaline  Phosphatase 154 (H) 40 - 150 U/L     (H) 0 - 50 U/L     (H) 0 - 45 U/L   CBC with platelets    Collection Time: 07/18/17  7:12 AM   Result Value Ref Range    WBC 3.3 (L) 4.0 - 11.0 10e9/L    RBC Count 4.22 3.8 - 5.2 10e12/L    Hemoglobin 14.4 11.7 - 15.7 g/dL    Hematocrit 41.5 35.0 - 47.0 %    MCV 98 78 - 100 fl    MCH 34.1 (H) 26.5 - 33.0 pg    MCHC 34.7 31.5 - 36.5 g/dL    RDW 12.3 10.0 - 15.0 %    Platelet Count 60 (L) 150 - 450 10e9/L   INR    Collection Time: 07/18/17  7:12 AM   Result Value Ref Range    INR 1.09 0.86 - 1.14   Basic metabolic panel    Collection Time: 07/18/17  7:12 AM   Result Value Ref Range    Sodium 138 133 - 144 mmol/L    Potassium 4.0 3.4 - 5.3 mmol/L    Chloride 106 94 - 109 mmol/L    Carbon Dioxide 27 20 - 32 mmol/L    Anion Gap 6 3 - 14 mmol/L    Glucose 95 70 - 99 mg/dL    Urea Nitrogen 16 7 - 30 mg/dL    Creatinine 0.68 0.52 - 1.04 mg/dL    GFR Estimate >90  Non  GFR Calc   >60 mL/min/1.7m2    GFR Estimate If Black >90   GFR Calc   >60 mL/min/1.7m2    Calcium 9.2 8.5 - 10.1 mg/dL      My impression is that Ms. Bullard has chronic hepatitis C with stage III fibrosis. I did review Ganesh Wright's last note and will attempt treatment with Epclusa. This is the recommended treatment for patients with a genotype 3 virus, without cirrhosis, and whose only previous treatments were interferon based.    I will see her back in the clinic in 4 months, which should be the end of treatment. I will plan on rechecking a fibrosis scan in 2 years.    Thank you very much for allowing me to participate in the care of this patient. If you have any questions regarding my recommendations, please do not hesitate to contact me.      David So MD      Professor of Medicine  [employer]      Executive Medical Director, Solid Organ Transplant Program  [facility]

## 2017-07-18 NOTE — MR AVS SNAPSHOT
After Visit Summary   7/18/2017    Parisa Wong    MRN: [id number]           Patient Information     Date Of Birth          1965        Visit Information        Provider Department      7/18/2017 8:15 AM David So MD [facility] Hepatology        Today's Diagnoses     Chronic hepatitis C without hepatic coma (H)    -  1       Follow-ups after your visit        Follow-up notes from your care team     Return in about 4 months (around 11/18/2017).      Your next 10 appointments already scheduled     Nov 21, 2017  9:30 AM CST   Lab with  LAB   [facility] Lab ([facility])    [address] [facility]  1st Floor  [facility] [address] [phone]            Nov 21, 2017 10:30 AM CST   (Arrive by 10:15 AM)   Return General Liver with David So MD   [facility] Hepatology ([facility])    [address] [place] [address]  3rd [facility] [address] [phone]              Who to contact     If you have questions or need follow up information about today's clinic visit or your schedule please contact [facility] HEPATOLOGY directly at [phone].  Normal or non-critical lab and imaging results will be communicated to you by MyChart, letter or phone within 4 business days after the clinic has received the results. If you do not hear from us within 7 days, please contact the clinic through [employer] or phone. If you have a critical or abnormal lab result, we will notify you by phone as soon as possible.  Submit refill requests through [facility] or call your pharmacy and they will forward the refill request to us. Please allow 3 business days for your refill to be completed.          Additional Information About Your Visit        MyChart Information     [facility] gives you secure access to your electronic health record. If you see a primary care provider, you can also send messages to your care team and make appointments. If you have questions, please call your  "primary care clinic.  If you do not have a primary care provider, please call [phone] and they will assist you.        Care EveryWhere ID     This is your Care EveryWhere ID. This could be used by other organizations to access your [place] medical records  [id number]        Your Vitals Were     Pulse Temperature Height Pulse Oximetry BMI (Body Mass Index)       70 98.2  F (36.8  C) (Oral) 1.613 m (5' 3.5\") 98% 25.88 kg/m2        Blood Pressure from Last 3 Encounters:   07/18/17 109/68   05/26/17 124/80   05/15/17 122/66    Weight from Last 3 Encounters:   07/18/17 67.3 kg (148 lb 6.4 oz)   05/26/17 68.5 kg (151 lb)   05/15/17 70.5 kg (155 lb 6.4 oz)              Today, you had the following     No orders found for display         Today's Medication Changes          These changes are accurate as of: 7/18/17 11:59 PM.  If you have any questions, ask your nurse or doctor.               Start taking these medicines.        Dose/Directions    sofosbuvir-velpatasvir 400-100 MG per tablet   Commonly known as:  EPCLUSA   Used for:  Chronic hepatitis C without hepatic coma (H)   Started by:  David So MD        Dose:  1 tablet   Take 1 tablet by mouth daily   Quantity:  28 tablet   Refills:  2            Where to get your medicines      These medications were sent to [facility] - [address]  [address] [place] [address]    Hours:  Mon-Fri 8:30am-5:00pm Toll Free [phone] Phone:  [phone]     sofosbuvir-velpatasvir 400-100 MG per tablet                Primary Care Provider Office Phone # Fax #    NELIA Hugo [phone]       [facility] [address] [facility] [address]        Equal Access to Services     BARRY BOWDEN AH: Jonnathan Earl, emmanuel dave, qaybta kaalmada mario cerrato. [facility] [phone].    ATENCIÓN: Si habla " español, tiene a rodriguez disposición servicios gratuitos de asistencia lingüística. Helen snider [phone].    We comply with applicable federal civil rights laws and [place] laws. We do not discriminate on the basis of race, color, national origin, age, disability sex, sexual orientation or gender identity.            Thank you!     Thank you for choosing [facility] HEPATOLOGY  for your care. Our goal is always to provide you with excellent care. Hearing back from our patients is one way we can continue to improve our services. Please take a few minutes to complete the written survey that you may receive in the mail after your visit with us. Thank you!             Your Updated Medication List - Protect others around you: Learn how to safely use, store and throw away your medicines at www.disposemymeds.org.          This list is accurate as of: 7/18/17 11:59 PM.  Always use your most recent med list.                   Brand Name Dispense Instructions for use Diagnosis    medical cannabis (Patient's own supply.  Not a prescription)      1 capsule daily (This is NOT a prescription, and does not certify that the patient has a qualifying medical condition for medical cannabis.  The purpose of this order is  to document that the patient reports taking medical cannabis.)        methyl salicylate liquid      Apply topically as needed for moderate to severe pain (Max Freeze)        sofosbuvir-velpatasvir 400-100 MG per tablet    EPCLUSA    28 tablet    Take 1 tablet by mouth daily    Chronic hepatitis C without hepatic coma (H)       * UNABLE TO FIND      MEDICATION NAME: Iodine, 3 drops on tongue once per day        * UNABLE TO FIND      MEDICATION NAME: Diatomaceous        * Notice:  This list has 2 medication(s) that are the same as other medications prescribed for you. Read the directions carefully, and ask your doctor or other care provider to review them with you.

## 2017-07-18 NOTE — PROGRESS NOTES
I had the pleasure of seeing Parisa Wong for follow-up in the clinic at the [facility] on July 18, 2017. This Marvin returns for follow-up of chronic hepatitis C. She has had recent imaging studies which suggest that she has stage III fibrosis.    She states she feels fairly well. She does note some mild right upper quadrant pain that is not related to meals and is somewhat positional. She does note fairly significant itching in the absence of skin rash. She does complain of some fatigue and naps a daily basis.    She denies any fevers or chills, cough or shortness of breath. She denies any nausea or vomiting, diarrhea or constipation. Her appetite has been good.    In terms of her past hepatitis c treatments, she'll was treated with interferon in the 90s and does not believe that she actually received ribavirin.    Current Outpatient Prescriptions   Medication     medical cannabis (Patient's own supply.  Not a prescription)     sofosbuvir-velpatasvir (EPCLUSA) 400-100 MG per tablet     UNABLE TO FIND     UNABLE TO FIND     methyl salicylate liquid     No current facility-administered medications for this visit.      B/P: 109/68, T: 98.2, P: 70, R: Data Unavailable  PHYSICAL EXAMINATION: She appears well.  HEENT examination shows no scleral icterus and no temporal muscle wasting.  Her chest is clear.  Her abdominal examination shows no masses or tenderness to palpation are present.  Her liver and spleen are not palpable.  Extremity examination shows no edema. Skin exam shows no stigmata of chronic pruritus.      Recent Results (from the past 168 hour(s))   Hepatic panel    Collection Time: 07/18/17  7:12 AM   Result Value Ref Range    Bilirubin Direct 0.5 (H) 0.0 - 0.2 mg/dL    Bilirubin Total 1.1 0.2 - 1.3 mg/dL    Albumin 3.8 3.4 - 5.0 g/dL    Protein Total 7.0 6.8 - 8.8 g/dL    Alkaline Phosphatase 154 (H) 40 - 150 U/L     (H) 0 - 50 U/L     (H) 0 - 45 U/L   CBC  with platelets    Collection Time: 07/18/17  7:12 AM   Result Value Ref Range    WBC 3.3 (L) 4.0 - 11.0 10e9/L    RBC Count 4.22 3.8 - 5.2 10e12/L    Hemoglobin 14.4 11.7 - 15.7 g/dL    Hematocrit 41.5 35.0 - 47.0 %    MCV 98 78 - 100 fl    MCH 34.1 (H) 26.5 - 33.0 pg    MCHC 34.7 31.5 - 36.5 g/dL    RDW 12.3 10.0 - 15.0 %    Platelet Count 60 (L) 150 - 450 10e9/L   INR    Collection Time: 07/18/17  7:12 AM   Result Value Ref Range    INR 1.09 0.86 - 1.14   Basic metabolic panel    Collection Time: 07/18/17  7:12 AM   Result Value Ref Range    Sodium 138 133 - 144 mmol/L    Potassium 4.0 3.4 - 5.3 mmol/L    Chloride 106 94 - 109 mmol/L    Carbon Dioxide 27 20 - 32 mmol/L    Anion Gap 6 3 - 14 mmol/L    Glucose 95 70 - 99 mg/dL    Urea Nitrogen 16 7 - 30 mg/dL    Creatinine 0.68 0.52 - 1.04 mg/dL    GFR Estimate >90  Non  GFR Calc   >60 mL/min/1.7m2    GFR Estimate If Black >90   GFR Calc   >60 mL/min/1.7m2    Calcium 9.2 8.5 - 10.1 mg/dL      My impression is that Ms. Bullard has chronic hepatitis C with stage III fibrosis. I did review Ganesh Wright's last note and will attempt treatment with Epclusa. This is the recommended treatment for patients with a genotype 3 virus, without cirrhosis, and whose only previous treatments were interferon based.    I will see her back in the clinic in 4 months, which should be the end of treatment. I will plan on rechecking a fibrosis scan in 2 years.    Thank you very much for allowing me to participate in the care of this patient. If you have any questions regarding my recommendations, please do not hesitate to contact me.      David So MD      Professor of Medicine  [facility] [employer]      Executive Medical Director, Solid Organ Transplant Program  [facility]

## 2017-07-20 ENCOUNTER — TELEPHONE (OUTPATIENT)
Dept: GASTROENTEROLOGY | Facility: CLINIC | Age: 52
End: 2017-07-20

## 2017-07-20 NOTE — TELEPHONE ENCOUNTER
PA Initiation    Medication: Epclusa  Insurance Company: [employer] - Phone [phone] Fax [phone]  Pharmacy Filling the Rx: [place], [place] - [address] [place] [address]  Filling Pharmacy Phone: [phone]  Filling Pharmacy Fax:    Start Date: 7/20/2017

## 2017-07-24 DIAGNOSIS — B18.2 CHRONIC HEPATITIS C WITHOUT HEPATIC COMA (H): Primary | ICD-10-CM

## 2017-07-24 DIAGNOSIS — B18.2 CHRONIC HEPATITIS C WITHOUT HEPATIC COMA (H): ICD-10-CM

## 2017-07-24 PROCEDURE — 36415 COLL VENOUS BLD VENIPUNCTURE: CPT | Performed by: INTERNAL MEDICINE

## 2017-07-24 PROCEDURE — 87522 HEPATITIS C REVRS TRNSCRPJ: CPT | Performed by: INTERNAL MEDICINE

## 2017-07-24 NOTE — TELEPHONE ENCOUNTER
PRIOR AUTHORIZATION DENIED    Medication: Epclusa    Denial Date: 7/24/2017    Denial Rational: Plan requires viral load within 3 months of submitting PA.    Appeal Information: Please have viral load ordered and once completed I can submit that back to plan. Let me know if you have any questions, thank you.

## 2017-07-27 LAB
HCV RNA SERPL NAA+PROBE-ACNC: ABNORMAL [IU]/ML
HCV RNA SERPL NAA+PROBE-LOG IU: 5.5 LOG IU/ML

## 2017-07-27 NOTE — TELEPHONE ENCOUNTER
PA Initiation    Medication: Epclusa  Insurance Company: [employer] - Phone [phone] Fax [phone]  Pharmacy Filling the Rx: [address]  Filling Pharmacy Phone: [phone]  Filling Pharmacy Fax:    Start Date: 7/27/2017    Faxed PA with updated viral load into plan.

## 2017-08-02 NOTE — TELEPHONE ENCOUNTER
Prior Authorization Approval    Authorization Effective Date: 7/29/2017 x 12 weeks  Medication: Epclusa  Approved Dose/Quantity: 28/28 days  Reference #:     Insurance Company: [employer] - Phone [phone] Fax [phone]  Expected CoPay: Unknown, pt restricted     CoPay Card Available: Yes   Foundation Assistance Needed: No  Which Pharmacy is filling the prescription (Not needed for infusion/clinic administered): [address]

## 2017-08-04 NOTE — TELEPHONE ENCOUNTER
I left this patient a message to communicate transfer.  If she returns my call I will instruct her to contact the clinic.

## 2017-09-06 DIAGNOSIS — B18.2 CHRONIC HEPATITIS C WITHOUT HEPATIC COMA (H): ICD-10-CM

## 2017-09-06 DIAGNOSIS — B18.2 CHRONIC HEPATITIS C WITHOUT HEPATIC COMA (H): Primary | ICD-10-CM

## 2017-09-06 LAB
ALBUMIN SERPL-MCNC: 4.2 G/DL (ref 3.4–5)
ALP SERPL-CCNC: 119 U/L (ref 40–150)
ALT SERPL W P-5'-P-CCNC: 44 U/L (ref 0–50)
ANION GAP SERPL CALCULATED.3IONS-SCNC: 9 MMOL/L (ref 3–14)
AST SERPL W P-5'-P-CCNC: 34 U/L (ref 0–45)
BILIRUB DIRECT SERPL-MCNC: 0.2 MG/DL (ref 0–0.2)
BILIRUB SERPL-MCNC: 0.8 MG/DL (ref 0.2–1.3)
BUN SERPL-MCNC: 13 MG/DL (ref 7–30)
CALCIUM SERPL-MCNC: 9.4 MG/DL (ref 8.5–10.1)
CHLORIDE SERPL-SCNC: 104 MMOL/L (ref 94–109)
CO2 SERPL-SCNC: 24 MMOL/L (ref 20–32)
CREAT SERPL-MCNC: 0.73 MG/DL (ref 0.52–1.04)
GFR SERPL CREATININE-BSD FRML MDRD: 83 ML/MIN/1.7M2
GLUCOSE SERPL-MCNC: 120 MG/DL (ref 70–99)
POTASSIUM SERPL-SCNC: 3.7 MMOL/L (ref 3.4–5.3)
PROT SERPL-MCNC: 7.8 G/DL (ref 6.8–8.8)
SODIUM SERPL-SCNC: 138 MMOL/L (ref 133–144)

## 2017-09-26 ENCOUNTER — OFFICE VISIT (OUTPATIENT)
Dept: FAMILY MEDICINE | Facility: CLINIC | Age: 52
End: 2017-09-26
Payer: COMMERCIAL

## 2017-09-26 VITALS — DIASTOLIC BLOOD PRESSURE: 72 MMHG | HEART RATE: 68 BPM | SYSTOLIC BLOOD PRESSURE: 106 MMHG | TEMPERATURE: 97.8 F

## 2017-09-26 DIAGNOSIS — R21 RASH: Primary | ICD-10-CM

## 2017-09-26 DIAGNOSIS — Z12.31 ENCOUNTER FOR SCREENING MAMMOGRAM FOR BREAST CANCER: ICD-10-CM

## 2017-09-26 PROCEDURE — 99213 OFFICE O/P EST LOW 20 MIN: CPT | Performed by: NURSE PRACTITIONER

## 2017-09-26 NOTE — MR AVS SNAPSHOT
After Visit Summary   9/26/2017    Parisa Wong    MRN: [id number]           Patient Information     Date Of Birth          1965        Visit Information        Provider Department      9/26/2017 4:00 PM Imani Puentes APRN CNP [facility]        Today's Diagnoses     Rash    -  1    Encounter for screening mammogram for breast cancer          Care Instructions    For the rash .  Try taking For the allergies get started on a non-sedating anti-histamine such as Claritin, Allegra or Zyrtec, ( get the generic - it is a lot cheaper)  Stay well hydrated - urine should be clear.      at bedtime take the children's dose of  Benadryl - take it when you need to so you can wake up in the AM               Follow-ups after your visit        Additional Services     DERMATOLOGY REFERRAL       Your provider has referred you to: FM[facility]: [facility] [place] [phone]   [email or web address]  [facility]: [facility] [place] [phone]   [email or web address]  N: [facility] [place] [phone]   [email or web address]    Please be aware that coverage of these services is subject to the terms and limitations of your health insurance plan.  Call member services at your health plan with any benefit or coverage questions.      Please bring the following with you to your appointment:    (1) Any X-Rays, CTs or MRIs which have been performed.  Contact the facility where they were done to arrange for  prior to your scheduled appointment.    (2) List of current medications  (3) This referral request   (4) Any documents/labs given to you for this referral                  Your next 10 appointments already scheduled     Nov 21, 2017  9:30 AM CST   Lab with  LAB    Health Lab ([facility])    [address]  [address]   [phone]            Nov 21, 2017 10:30 AM CST   (Arrive by 10:15 AM)   Return General Liver with David So MD   [facility] ([facility])    [address] [facility]  [address] [facility] [address] [phone]              Future tests that were ordered for you today     Open Future Orders        Priority Expected Expires Ordered    *MA Screening Digital Bilateral Routine  9/26/2018 9/26/2017            Who to contact     Normal or non-critical lab and imaging results will be communicated to you by [employer], letter or phone within 4 business days after the clinic has received the results. If you do not hear from us within 7 days, please contact the clinic through [employer] or phone. If you have a critical or abnormal lab result, we will notify you by phone as soon as possible.  Submit refill requests through [employer] or call your pharmacy and they will forward the refill request to us. Please allow 3 business days for your refill to be completed.          If you need to speak with a  for additional information , please call: [phone]           Additional Information About Your Visit        [employer] Information     [employer] gives you secure access to your electronic health record. If you see a primary care provider, you can also send messages to your care team and make appointments. If you have questions, please call your primary care clinic.  If you do not have a primary care provider, please call [phone] and they will assist you.        Care EveryWhere ID     This is your Care EveryWhere ID. This could be used by other organizations to access your [facility] medical records  [id number]        Your Vitals Were     Pulse Temperature                68 97.8  F (36.6  C) (Tympanic)           Blood Pressure from Last 3 Encounters:   09/26/17 106/72   07/18/17 109/68   05/26/17 124/80    Weight from Last 3 Encounters:   07/18/17 148 lb 6.4 oz  (67.3 kg)   05/26/17 151 lb (68.5 kg)   05/15/17 155 lb 6.4 oz (70.5 kg)              We Performed the Following     DERMATOLOGY REFERRAL        Primary Care Provider Office Phone # Fax #    Imani Bah NELIA Krishnan [facility] [phone] [address] [facility] [address]        Equal Access to Services     BARRY BOWDEN : Hadii aad ku hadasho Soomaali, waaxda luqadaha, qaybta kaalmada adeegyada, waxay idiin hayaan adeeg kharash lamichaeln ah. So [facility] [phone].    ATENCIÓN: Si kenneth karimi, tiene a rodriguez disposición servicios gratuitos de asistencia lingüística. Llame al [phone].    We comply with applicable federal civil rights laws and [place] laws. We do not discriminate on the basis of race, color, national origin, age, disability sex, sexual orientation or gender identity.            Thank you!     Thank you for choosing [facility]  for your care. Our goal is always to provide you with excellent care. Hearing back from our patients is one way we can continue to improve our services. Please take a few minutes to complete the written survey that you may receive in the mail after your visit with us. Thank you!             Your Updated Medication List - Protect others around you: Learn how to safely use, store and throw away your medicines at www.disposemymeds.org.          This list is accurate as of: 9/26/17  4:45 PM.  Always use your most recent med list.                   Brand Name Dispense Instructions for use Diagnosis    medical cannabis (Patient's own supply.  Not a prescription)      1 capsule daily (This is NOT a prescription, and does not certify that the patient has a qualifying medical condition for medical cannabis.  The purpose of this order is  to document that the patient reports taking medical cannabis.)        methyl salicylate liquid      Apply topically as needed for moderate to severe pain (Max Freeze)        sofosbuvir-velpatasvir 400-100 MG  per tablet    EPCLUSA    28 tablet    Take 1 tablet by mouth daily    Chronic hepatitis C without hepatic coma (H)       * UNABLE TO FIND      MEDICATION NAME: Iodine, 3 drops on tongue once per day        * UNABLE TO FIND      MEDICATION NAME: Diatomaceous        * Notice:  This list has 2 medication(s) that are the same as other medications prescribed for you. Read the directions carefully, and ask your doctor or other care provider to review them with you.

## 2017-09-26 NOTE — NURSING NOTE
"Chief Complaint   Patient presents with     Rash       Initial /72 (BP Location: Left arm, Patient Position: Chair, Cuff Size: Adult Regular)  Pulse 68  Temp 97.8  F (36.6  C) (Tympanic) Estimated body mass index is 25.88 kg/(m^2) as calculated from the following:    Height as of 7/18/17: 5' 3.5\" (1.613 m).    Weight as of 7/18/17: 148 lb 6.4 oz (67.3 kg).  Medication Reconciliation: complete     Sofia Mckay CMA (AAMA)      "

## 2017-09-26 NOTE — PATIENT INSTRUCTIONS
For the rash .  Try taking For the allergies get started on a non-sedating anti-histamine such as Claritin, Allegra or Zyrtec, ( get the generic - it is a lot cheaper)  Stay well hydrated - urine should be clear.      at bedtime take the children's dose of  Benadryl - take it when you need to so you can wake up in the AM

## 2017-09-26 NOTE — PROGRESS NOTES
"  SUBJECTIVE:   Parisa Wong is a 52 year old female who presents to clinic today for the following health issues:      Rash  Onset: June/July 2017    Description:   Location: Both sides of her neck and both breasts  Character: looks like \"broken blood vessels\"   Itching (Pruritis): YES, more so at night    Progression of Symptoms:  Was all over the whole body, then started epclusa and the rash is now only on the breasts and neck    Accompanying Signs & Symptoms:  Fever: no   Body aches or joint pain: no   Sore throat symptoms: no   Recent cold symptoms: no     History:   Previous similar rash: no     Precipitating factors:   Exposure to similar rash: no   New exposures: None   Recent travel: no     Alleviating factors:  Benadryl cream    Therapies Tried and outcome: benadryl cream - does help with the itching but the rash remains.      Her skin all over was itchy   . She would scratch  To the point that she would scratch to the point that her ankles would bleed,  She did use OTC Benadryl and cortisone cream and that did take care of the  Total body rash  BUT she does still have a rash on the breast, and the neck lymph nodes are swollen      Problem list and histories reviewed & adjusted, as indicated.  Additional history: as documented    Patient Active Problem List   Diagnosis     GOITER UNINODULAR, NONTOXIC     ENLARGED UTERUS     CARDIOVASCULAR SCREENING; LDL GOAL LESS THAN 160     24 hour contact handout given     Memory difficulty     Elevated liver enzymes     Vitamin D deficiency     Colon polyps     Obese     Knee pain     SI (sacroiliac) joint inflammation (H)     Lumbar spinal stenosis     Thumb pain     Chronic hepatitis C without hepatic coma (H)     Degenerative arthritis of thumb     Past Surgical History:   Procedure Laterality Date     COLONOSCOPY  1/29/2014    Procedure: COMBINED COLONOSCOPY, SINGLE BIOPSY/POLYPECTOMY BY BIOPSY;  Gastroscopy;  Surgeon: Prashanth Lin MD;  Location: [place]     D & C  6-     HC ABLATION, ENDOMETRIAL, THERMAL, W/O HYSTEROSCOPIC GUIDANCE  6-    abiel     SURGICAL HISTORY OF -   1979    tonsillectomy and adenoidectomy     SURGICAL HISTORY OF -       discectomy L5, S1       Social History   Substance Use Topics     Smoking status: Former Smoker     Types: Cigars     Smokeless tobacco: Never Used      Comment: son and huband smoke in the house     Alcohol use 2.4 - 3.0 oz/week     4 - 5 Cans of beer per week      Comment: none since november     Family History   Problem Relation Age of Onset     CANCER Maternal Grandmother      throat     Alcohol/Drug Maternal Grandfather      alcohol     Neurologic Disorder Daughter      cerebral palsy     GASTROINTESTINAL DISEASE Mother      Divertic     Gynecology Mother      Hysterectomy     CANCER Mother      Leukemia     Breast Cancer Maternal Aunt      Unknown/Adopted Father      Cancer - colorectal No family hx of      Prostate Cancer No family hx of      C.A.D. No family hx of          Current Outpatient Prescriptions   Medication Sig Dispense Refill     sofosbuvir-velpatasvir (EPCLUSA) 400-100 MG per tablet Take 1 tablet by mouth daily 28 tablet 2     methyl salicylate liquid Apply topically as needed for moderate to severe pain (Max Freeze)        medical cannabis (Patient's own supply.  Not a prescription) 1 capsule daily (This is NOT a prescription, and does not certify that the patient has a qualifying medical condition for medical cannabis.  The purpose of this order is  to document that the patient reports taking medical cannabis.)       UNABLE TO FIND MEDICATION NAME: Diatomaceous       UNABLE TO FIND MEDICATION NAME: Iodine, 3 drops on tongue once per day       Allergies   Allergen Reactions     Codeine      Sulfa Drugs      BP Readings from Last 3 Encounters:   09/26/17 106/72   07/18/17 109/68   05/26/17 124/80    Wt Readings from Last 3 Encounters:   07/18/17 148 lb 6.4 oz (67.3 kg)   05/26/17 151 lb  (68.5 kg)   05/15/17 155 lb 6.4 oz (70.5 kg)                  Reviewed and updated as needed this visit by clinical staff     Reviewed and updated as needed this visit by Provider         ROS:  C: NEGATIVE for fever, chills, change in weight  INTEGUMENTARY/SKIN: POSITIVE for pigmentation rash and intermittent pruritis of torso and breasts  E/M: NEGATIVE for ear, mouth and throat problems  R: NEGATIVE for significant cough or SOB  CV: NEGATIVE for chest pain, palpitations or peripheral edema    OBJECTIVE:     /72 (BP Location: Left arm, Patient Position: Chair, Cuff Size: Adult Regular)  Pulse 68  Temp 97.8  F (36.6  C) (Tympanic)  There is no height or weight on file to calculate BMI.   GENERAL: healthy, alert and no distress  EYES: Eyes grossly normal to inspection, PERRL and conjunctivae and sclerae normal  HENT: ear canals and TM's normal, nose and mouth without ulcers or lesions  NECK: no adenopathy, no asymmetry, masses, or scars and thyroid normal to palpation  RESP: lungs clear to auscultation - no rales, rhonchi or wheezes  BREAST: normal without masses, tenderness or nipple discharge and no palpable axillary masses or adenopathy  CV: regular rate and rhythm, normal S1 S2, no S3 or S4, no murmur, click or rub, no peripheral edema and peripheral pulses strong  ABDOMEN: soft, nontender, no hepatosplenomegaly, no masses and bowel sounds normal  MS: no gross musculoskeletal defects noted, no edema  SKIN: POSITIVE hyperpigmentation macular rash - breast and torso  NEURO: Normal strength and tone, mentation intact and speech normal    Diagnostic Test Results:  none     ASSESSMENT/PLAN:     ASSESSMENT/PLAN:      ICD-10-CM    1. Rash R21 DERMATOLOGY REFERRAL   2. Encounter for screening mammogram for breast cancer Z12.31 *MA Screening Digital Bilateral       Patient Instructions   For the rash .  Try taking For the allergies get started on a non-sedating anti-histamine such as Claritin, Allegra or Zyrtec, (  get the generic - it is a lot cheaper)  Stay well hydrated - urine should be clear.      at bedtime take the children's dose of  Benadryl - take it when you need to so you can wake up in the AM       MEDICATIONS:        - Start taking zyrtec        - Continue other medications without change  See Patient Instructions    ANGE CARPENTER NP, APRN [facility]

## 2017-10-16 ENCOUNTER — MYC MEDICAL ADVICE (OUTPATIENT)
Dept: FAMILY MEDICINE | Facility: CLINIC | Age: 52
End: 2017-10-16

## 2017-10-16 NOTE — TELEPHONE ENCOUNTER
Did speak with pt and she states that she has total resolution of rash on breast area.  No redness or itching.  Please address pt question below.    Maya WEAVER RN

## 2017-10-23 ENCOUNTER — HOSPITAL ENCOUNTER (OUTPATIENT)
Dept: MAMMOGRAPHY | Facility: CLINIC | Age: 52
Discharge: HOME | End: 2017-10-23
Attending: NURSE PRACTITIONER | Admitting: NURSE PRACTITIONER
Payer: COMMERCIAL

## 2017-10-23 DIAGNOSIS — Z12.31 ENCOUNTER FOR SCREENING MAMMOGRAM FOR BREAST CANCER: ICD-10-CM

## 2017-10-23 PROCEDURE — 77063 BREAST TOMOSYNTHESIS BI: CPT

## 2017-10-23 PROCEDURE — G0202 SCR MAMMO BI INCL CAD: HCPCS

## 2017-10-27 ENCOUNTER — CARE COORDINATION (OUTPATIENT)
Dept: GASTROENTEROLOGY | Facility: CLINIC | Age: 52
End: 2017-10-27

## 2017-10-27 DIAGNOSIS — B18.2 CHRONIC HEPATITIS C WITHOUT HEPATIC COMA (H): Primary | ICD-10-CM

## 2017-10-27 NOTE — PROGRESS NOTES
10/27/2017  1:46 PM        Hep C Care Coordination  Patient was started on Hep C treatment of Epclusa which she will be taking for x12 weeks. It was reported by Dory RIGGS LPN in Hepatology that patient reported starting on 8/15/17. I will base the treatment POC for Hep C on this start date;     Below is a summary of your treatment schedule. Please follow the schedule as closely as possible. Labs should be drawn as close to the date indicated at the [facility] or [facility].    Hepatitis C Treatment  Treatment: Epclusa x12 weeks   Genotype: 3  Stage Fibrosis: F3  Previous treatment non responder     Start Date: 08/15/17    Week 4  Hepatic panel, BMP Lab Due: 9/12/2017. Will have labs drawn on 11/21/17 at provider appointment.     Week 12 - End of Treatment  HCV RNA Quant Lab Due  Office Visit Date:  You have an appointment with Dr. So on 11/21/17 at 10:30am at the [facility] located at [address] [place] [address] on the 3rd floor. Please arrive at 9:30am to have your labs drawn prior to your provider appointment. The lab is located on the first floor of the [facility]. You do not need to fast for these labs.     3 Months Post Treatment  HCV RNA Quant Lab Due: Please have this lab drawn on the specified date of 2/5/18 or within a week after. This final lab will determine if you have cleared the Hepatitis C virus with Epclusa treatment. Without his final lab, we will be unable to determine if treatment was successful. You do not need to fast for this lab.       Educational information to patient on Hep C treatment;     -Contact the [facility] Hepat[facility] clinic and speak with RN Care Coordinator prior to starting any new prescribed or OTC medications.   -Take medications exactly as prescribed, do not change dose or stop taking without consulting your provider.   -Take Medication one time each day with or without food  -If you miss a dose of medication, then take it as soon as you remember on the  same day. If not remembered on the same day, then skip the dose and take the next dose at the usual time. Do not take more than the recommended dose. Contact the clinic if you miss a dose.    Please contact the pharmacy 1-2 weeks prior to needing a medication refill.      Side Effects  The most common side effects of Hep C medication treatment can include:  -tiredness  -headache  Notify the clinic of any side effects that bother you or that do not go away.   Possible side effects have been discussed.   Patient has been instructed to clinic for rash, itching or unmanageable nausea.    How to store Hep C Treatment Medications  -Store Medication at room temperature below 86 degrees F  -Keep Medication in it's original container  -Do not use Medication if the seal is broken or missing    General information  It is not known if treatment will prevent you from infecting another person or reinfecting yourself with the hepatitis C virus during treatment. It is best that as soon as you start treatment to buy a new toothbrush, disposable razors (if you use a rotating shaver you do not need to buy a new one) and nail clippers. If you check your blood sugar at home, please dispose of the fingerstick needle after each use and DO NOT REUSE the insulin needles. These items should not be shared with anyone.        If you have any questions, please contact the main clinic at [phone] or your RN Care Coordinator at [phone]. We appreciate you choosing the [facility] clinic for your treatment. Patient agrees to Hep C treatment POC and verbalizes understanding. Patient will receive a copy of treatment plan in the mail, address verified with patient. Patient has no further questions or concerns. Hep C care team updated on patient status.          Bernadette Humphries RN, BSN, PHN  [facility]  Care Coordinator for Hepatology Clinic/Specialty Program

## 2017-10-27 NOTE — LETTER
October 27, 2017       TO: [place]a Wong  [address] [place] [address]       Dear Ms. Wong,    Below is a summary of your treatment schedule. Please follow the schedule as closely as possible. Labs should be drawn as close to the date indicated at the [facility] or [facility].    Hepatitis C Treatment  Treatment: Epclusa x12 weeks     Start Date: 08/15/17    Week 4  Hepatic panel, BMP Lab Due: 9/12/2017. Will have labs drawn on 11/21/17 at provider appointment.     Week 12 - End of Treatment  HCV RNA Quant Lab Due  Office Visit Date:  You have an appointment with Dr. So on 11/21/17 at 10:30am at the [facility] located at [address] on the 3rd floor. Please arrive at 9:30am to have your labs drawn prior to your provider appointment. The lab is located on the first floor of the [facility]. You do not need to fast for these labs.     3 Months Post Treatment  HCV RNA Quant Lab Due: Please have this lab drawn on the specified date of 2/5/18 or within a week after. This final lab will determine if you have cleared the Hepatitis C virus with Epclusa treatment. Without his final lab, we will be unable to determine if treatment was successful. You do not need to fast for this lab.             Educational information to patient on Hep C treatment;     -Contact the [facility] Hepatology clinic and speak with RN Care Coordinator prior to starting any new prescribed or OTC medications.   -Take medications exactly as prescribed, do not change dose or stop taking without consulting your provider.   -Take Medication one time each day with or without food  -If you miss a dose of medication, then take it as soon as you remember on the same day. If not remembered on the same day, then skip the dose and take the next dose at the usual time. Do not take more than the recommended dose. Contact the clinic if you miss a dose.    Please contact the pharmacy 1-2 weeks prior to  needing a medication refill.      Side Effects  The most common side effects of Hep C medication treatment can include:  -tiredness  -headache  Notify the clinic of any side effects that bother you or that do not go away.   Possible side effects have been discussed.   Patient has been instructed to clinic for rash, itching or unmanageable nausea.    How to store Hep C Treatment Medications  -Store Medication at room temperature below 86 degrees F  -Keep Medication in it's original container  -Do not use Medication if the seal is broken or missing    General information  It is not known if treatment will prevent you from infecting another person or reinfecting yourself with the hepatitis C virus during treatment. It is best that as soon as you start treatment to buy a new toothbrush, disposable razors (if you use a rotating shaver you do not need to buy a new one) and nail clippers. If you check your blood sugar at home, please dispose of the fingerstick needle after each use and DO NOT REUSE the insulin needles. These items should not be shared with anyone.        If you have any questions, please contact the main clinic at [phone] or your RN Care Coordinator at [phone]. We appreciate you choosing the [facility] for your treatment.             Bernadette Humphries RN, BSN, PHN  [facility]  Care Coordinator for Hepatology Clinic/Specialty Program

## 2017-11-16 ENCOUNTER — TELEPHONE (OUTPATIENT)
Dept: GASTROENTEROLOGY | Facility: CLINIC | Age: 52
End: 2017-11-16

## 2017-11-16 NOTE — TELEPHONE ENCOUNTER
Left message for pt reminding them of upcoming appointment.  Instructed pt to bring updated medications list.  Instructed pt to arrive an hour and a half to two hours prior to appt time for labs.  Aryan Holley, CMA

## 2017-11-21 ENCOUNTER — OFFICE VISIT (OUTPATIENT)
Dept: GASTROENTEROLOGY | Facility: CLINIC | Age: 52
End: 2017-11-21
Attending: INTERNAL MEDICINE
Payer: COMMERCIAL

## 2017-11-21 VITALS
SYSTOLIC BLOOD PRESSURE: 111 MMHG | DIASTOLIC BLOOD PRESSURE: 73 MMHG | TEMPERATURE: 98.4 F | BODY MASS INDEX: 24.96 KG/M2 | WEIGHT: 146.2 LBS | HEIGHT: 64 IN | OXYGEN SATURATION: 96 % | HEART RATE: 70 BPM

## 2017-11-21 DIAGNOSIS — B18.2 CHRONIC HEPATITIS C WITHOUT HEPATIC COMA (H): ICD-10-CM

## 2017-11-21 DIAGNOSIS — B18.2 CHRONIC HEPATITIS C WITHOUT HEPATIC COMA (H): Primary | ICD-10-CM

## 2017-11-21 LAB
ALBUMIN SERPL-MCNC: 3.8 G/DL (ref 3.4–5)
ALP SERPL-CCNC: 148 U/L (ref 40–150)
ALT SERPL W P-5'-P-CCNC: 26 U/L (ref 0–50)
ANION GAP SERPL CALCULATED.3IONS-SCNC: 7 MMOL/L (ref 3–14)
AST SERPL W P-5'-P-CCNC: 21 U/L (ref 0–45)
BILIRUB DIRECT SERPL-MCNC: <0.1 MG/DL (ref 0–0.2)
BILIRUB SERPL-MCNC: 0.3 MG/DL (ref 0.2–1.3)
BUN SERPL-MCNC: 14 MG/DL (ref 7–30)
CALCIUM SERPL-MCNC: 8.8 MG/DL (ref 8.5–10.1)
CHLORIDE SERPL-SCNC: 105 MMOL/L (ref 94–109)
CO2 SERPL-SCNC: 27 MMOL/L (ref 20–32)
CREAT SERPL-MCNC: 0.65 MG/DL (ref 0.52–1.04)
GFR SERPL CREATININE-BSD FRML MDRD: >90 ML/MIN/1.7M2
GLUCOSE SERPL-MCNC: 89 MG/DL (ref 70–99)
POTASSIUM SERPL-SCNC: 3.7 MMOL/L (ref 3.4–5.3)
PROT SERPL-MCNC: 7.1 G/DL (ref 6.8–8.8)
SODIUM SERPL-SCNC: 139 MMOL/L (ref 133–144)

## 2017-11-21 PROCEDURE — 80048 BASIC METABOLIC PNL TOTAL CA: CPT | Performed by: INTERNAL MEDICINE

## 2017-11-21 PROCEDURE — 36415 COLL VENOUS BLD VENIPUNCTURE: CPT | Performed by: INTERNAL MEDICINE

## 2017-11-21 PROCEDURE — 99212 OFFICE O/P EST SF 10 MIN: CPT | Mod: ZF

## 2017-11-21 PROCEDURE — 87522 HEPATITIS C REVRS TRNSCRPJ: CPT | Performed by: INTERNAL MEDICINE

## 2017-11-21 PROCEDURE — 80076 HEPATIC FUNCTION PANEL: CPT | Performed by: INTERNAL MEDICINE

## 2017-11-21 ASSESSMENT — PAIN SCALES - GENERAL: PAINLEVEL: NO PAIN (0)

## 2017-11-21 NOTE — NURSING NOTE
Chief Complaint   Patient presents with     RECHECK     Chronic hepatitis C without hepatic coma    Pt roomed, vitals, meds, and allergies reviewed with pt. Pt ready for provider.  Aryan Holley, CMA

## 2017-11-21 NOTE — LETTER
"11/21/2017      RE: [place]a Wong  [address] [facility] [address]       Assessment/Plan    # Chronic hepatitis C   Genotype 3a, previous treatment interferon-based. Has now completed treatment with Epclusa. Imaging in May consistent with stage 3 fibrosis. MELD-Na 8 today.   - Return to clinic in 6 months with labs  - US and blood work every 6 months for HCC screening  - Labs at 3 months to evaluate for sustained virologic response     The patient was seen and discussed with the attending GI staff, Dr. So.     Murali Fernandez MD  PGY-1, Internal Medicine  P [phone]    The patient was seen and examined.  The above assessment and plan was developed jointly with the resident.      David So MD      Professor of Medicine  [employer]      Executive Medical Director, Solid Organ Transplant Program  [facility]     Subjective    Parisa Wong returns to clinic for follow-up of chronic hepatitis C.    Since her last visit, she has completed treatment with Epclusa. She reports that now that she has finished (7th of November), she didn't know just how tired/exhausted  she was while she was on it. Notes that she is less achy as well.     Overall, she reports that she is feeling \"pretty good.\" Prior to taking the Epclusa, she had RUQ pain; this went away right after she started treatment, along with her itching. She says she's still tired, but that she thinks this has more to do with the time of year and the weather than anything.     Appetite is good. Lost a lot of weight starting in 2015, but this had more to do with stopping drinking (November 2016) and changing her diet.      She denies any fevers or chills, cough or shortness of breath. She denies any nausea or vomiting, or constipation. Went out to eat for lunch 4-6 weeks ago, had diarrhea following; nothing since.     Not interested in flu shot. Would be interested in tetanus " "shot.    Objective  /73  Pulse 70  Temp 98.4  F (36.9  C) (Oral)  Ht 1.613 m (5' 3.5\")  Wt 66.3 kg (146 lb 3.2 oz)  SpO2 96%  BMI 25.49 kg/m2    Physical Exam  HEENT: NC/AT. No scleral icterus. PERRL, EOMI. Oropharynx non-erythematous, no lesions appreciated. No lymphadenopathy.   Resp: CTAB, no adventitious sounds  CV: RRR, no M/R/G  Abd: Soft, non-tender, non-distended. BS+, normoactive.  Extremities: warm, well-perfused, no LE edema  Skin: No rashes or jaundice  Neuro: CNII-XII intact. No asterixis.    Orders Only on 11/21/2017   Component Date Value Ref Range Status     Sodium 11/21/2017 139  133 - 144 mmol/L Final     Potassium 11/21/2017 3.7  3.4 - 5.3 mmol/L Final     Chloride 11/21/2017 105  94 - 109 mmol/L Final     Carbon Dioxide 11/21/2017 27  20 - 32 mmol/L Final     Anion Gap 11/21/2017 7  3 - 14 mmol/L Final     Glucose 11/21/2017 89  70 - 99 mg/dL Final     Urea Nitrogen 11/21/2017 14  7 - 30 mg/dL Final     Creatinine 11/21/2017 0.65  0.52 - 1.04 mg/dL Final     GFR Estimate 11/21/2017 >90  >60 mL/min/1.7m2 Final    Non  GFR Calc     GFR Estimate If Black 11/21/2017 >90  >60 mL/min/1.7m2 Final    African American GFR Calc     Calcium 11/21/2017 8.8  8.5 - 10.1 mg/dL Final     Bilirubin Direct 11/21/2017 <0.1  0.0 - 0.2 mg/dL Final     Bilirubin Total 11/21/2017 0.3  0.2 - 1.3 mg/dL Final     Albumin 11/21/2017 3.8  3.4 - 5.0 g/dL Final     Protein Total 11/21/2017 7.1  6.8 - 8.8 g/dL Final     Alkaline Phosphatase 11/21/2017 148  40 - 150 U/L Final     ALT 11/21/2017 26  0 - 50 U/L Final     AST 11/21/2017 21  0 - 45 U/L Final     MELD-Na score: 8 at 7/18/2017  7:12 AM  MELD score: 8 at 7/18/2017  7:12 AM  Calculated from:  Serum Creatinine: 0.68 mg/dL (Rounded to 1) at 7/18/2017  7:12 AM  Serum Sodium: 138 mmol/L (Rounded to 137) at 7/18/2017  7:12 AM  Total Bilirubin: 1.1 mg/dL at 7/18/2017  7:12 AM  INR(ratio): 1.09 at 7/18/2017  7:12 AM  Age: 52 years    Imaging  US " Abdomen 6/21/2017 Impression: 1.  Cholelithiasis. Minimal pericholecystic fluid may be related to liver parenchymal disease, though cholecystitis cannot be definitively excluded. Otherwise no features of acute cholecystitis. 2.  No focal liver lesion. 3.  Borderline splenomegaly.      David So MD

## 2017-11-21 NOTE — LETTER
"11/21/2017       RE: [place]a Wong  [address] [place] [address]     Dear Colleague,    Thank you for referring your patient, Parisa Wong, to the [facility] HEPATOLOGY at [facility]. Please see a copy of my visit note below.    Assessment/Plan    # Chronic hepatitis C   Genotype 3a, previous treatment interferon-based. Has now completed treatment with Epclusa. Imaging in May consistent with stage 3 fibrosis. MELD-Na 8 today.   - Return to clinic in 6 months with labs  - US and blood work every 6 months for HCC screening  - Labs at 3 months to evaluate for sustained virologic response     The patient was seen and discussed with the attending GI staff, Dr. So.     Murali Fernandez MD  PGY-1, Internal Medicine  P [phone]    The patient was seen and examined.  The above assessment and plan was developed jointly with the resident.      David So MD      Professor of Medicine  [employer]      Executive Medical Director, Solid Organ Transplant Program  [facility]     Subjective    Parisa Wong returns to clinic for follow-up of chronic hepatitis C.    Since her last visit, she has completed treatment with Epclusa. She reports that now that she has finished (7th of November), she didn't know just how tired/exhausted  she was while she was on it. Notes that she is less achy as well.     Overall, she reports that she is feeling \"pretty good.\" Prior to taking the Epclusa, she had RUQ pain; this went away right after she started treatment, along with her itching. She says she's still tired, but that she thinks this has more to do with the time of year and the weather than anything.     Appetite is good. Lost a lot of weight starting in 2015, but this had more to do with stopping drinking (November 2016) and changing her diet.      She denies any fevers or chills, cough or shortness of breath. She denies any " "nausea or vomiting, or constipation. Went out to eat for lunch 4-6 weeks ago, had diarrhea following; nothing since.     Not interested in flu shot. Would be interested in tetanus shot.    Objective  /73  Pulse 70  Temp 98.4  F (36.9  C) (Oral)  Ht 1.613 m (5' 3.5\")  Wt 66.3 kg (146 lb 3.2 oz)  SpO2 96%  BMI 25.49 kg/m2    Physical Exam  HEENT: NC/AT. No scleral icterus. PERRL, EOMI. Oropharynx non-erythematous, no lesions appreciated. No lymphadenopathy.   Resp: CTAB, no adventitious sounds  CV: RRR, no M/R/G  Abd: Soft, non-tender, non-distended. BS+, normoactive.  Extremities: warm, well-perfused, no LE edema  Skin: No rashes or jaundice  Neuro: CNII-XII intact. No asterixis.    Orders Only on 11/21/2017   Component Date Value Ref Range Status     Sodium 11/21/2017 139  133 - 144 mmol/L Final     Potassium 11/21/2017 3.7  3.4 - 5.3 mmol/L Final     Chloride 11/21/2017 105  94 - 109 mmol/L Final     Carbon Dioxide 11/21/2017 27  20 - 32 mmol/L Final     Anion Gap 11/21/2017 7  3 - 14 mmol/L Final     Glucose 11/21/2017 89  70 - 99 mg/dL Final     Urea Nitrogen 11/21/2017 14  7 - 30 mg/dL Final     Creatinine 11/21/2017 0.65  0.52 - 1.04 mg/dL Final     GFR Estimate 11/21/2017 >90  >60 mL/min/1.7m2 Final    Non  GFR Calc     GFR Estimate If Black 11/21/2017 >90  >60 mL/min/1.7m2 Final    African American GFR Calc     Calcium 11/21/2017 8.8  8.5 - 10.1 mg/dL Final     Bilirubin Direct 11/21/2017 <0.1  0.0 - 0.2 mg/dL Final     Bilirubin Total 11/21/2017 0.3  0.2 - 1.3 mg/dL Final     Albumin 11/21/2017 3.8  3.4 - 5.0 g/dL Final     Protein Total 11/21/2017 7.1  6.8 - 8.8 g/dL Final     Alkaline Phosphatase 11/21/2017 148  40 - 150 U/L Final     ALT 11/21/2017 26  0 - 50 U/L Final     AST 11/21/2017 21  0 - 45 U/L Final     MELD-Na score: 8 at 7/18/2017  7:12 AM  MELD score: 8 at 7/18/2017  7:12 AM  Calculated from:  Serum Creatinine: 0.68 mg/dL (Rounded to 1) at 7/18/2017  7:12 AM  Serum " Sodium: 138 mmol/L (Rounded to 137) at 7/18/2017  7:12 AM  Total Bilirubin: 1.1 mg/dL at 7/18/2017  7:12 AM  INR(ratio): 1.09 at 7/18/2017  7:12 AM  Age: 52 years    Imaging  US Abdomen 6/21/2017 Impression: 1.  Cholelithiasis. Minimal pericholecystic fluid may be related to liver parenchymal disease, though cholecystitis cannot be definitively excluded. Otherwise no features of acute cholecystitis. 2.  No focal liver lesion. 3.  Borderline splenomegaly.      David So MD

## 2017-11-21 NOTE — MR AVS SNAPSHOT
After Visit Summary   11/21/2017    Parisa Wong    MRN: [id number]           Patient Information     Date Of Birth          1965        Visit Information        Provider Department      11/21/2017 10:30 AM David So MD [facility] Hepatology        Today's Diagnoses     Chronic hepatitis C without hepatic coma (H)    -  1       Follow-ups after your visit        Follow-up notes from your care team     Return in about 6 months (around 5/21/2018).      Your next 10 appointments already scheduled     May 22, 2018  8:00 AM CDT   Lab with  LAB   [facility] ([facility])    [address] [place] [id number] [phone]            May 22, 2018  8:15 AM CDT   US ABDOMEN LIMITED with UCUS2   [facility] ([facility])    [address] [place] [id number] [phone]           Please bring a list of your medicines (including vitamins, minerals and over-the-counter drugs). Also, tell your doctor about any allergies you may have. Wear comfortable clothes and leave your valuables at home.  Adults: No eating or drinking for 8 hours before the exam. You may take medicine with a small sip of water.  Children: - Children 6+ years: No food or drink for 6 hours before exam. - Children 1-5 years: No food or drink for 4 hours before exam. - Infants, breast-fed: may have breast milk up to 2 hours before exam. - Infants, formula: may have bottle until 4 hours before exam.  Please call the Imaging Department at your exam site with any questions.            May 22, 2018  9:30 AM CDT   (Arrive by 9:15 AM)   Return General Liver with David So MD   [facility] Hepatology ([facility])    [address] [place] [id number] [phone]              Who to contact     If you have questions or need follow up information about today's clinic visit or your  "schedule please contact [facility] HEPATOLOGY directly at [phone].  Normal or non-critical lab and imaging results will be communicated to you by MyChart, letter or phone within 4 business days after the clinic has received the results. If you do not hear from us within 7 days, please contact the clinic through [employer] or phone. If you have a critical or abnormal lab result, we will notify you by phone as soon as possible.  Submit refill requests through [employer] or call your pharmacy and they will forward the refill request to us. Please allow 3 business days for your refill to be completed.          Additional Information About Your Visit        [employer] Information     [employer] lets you send messages to your doctor, view your test results, renew your prescriptions, schedule appointments and more. To sign up, go to [email or web address] . Click on \"Log in\" on the left side of the screen, which will take you to the Welcome page. Then click on \"Sign up Now\" on the right side of the page.     You will be asked to enter the access code listed below, as well as some personal information. Please follow the directions to create your username and password.     Your access code is: [email or web address]  Expires: 2018  1:06 PM     Your access code will  in 90 days. If you need help or a new code, please call your [facility] clinic or [phone].        Care EveryWhere ID     This is your Care EveryWhere ID. This could be used by other organizations to access your [facility] medical records  [id number]        Your Vitals Were     Pulse Temperature Height Pulse Oximetry BMI (Body Mass Index)       70 98.4  F (36.9  C) (Oral) 1.613 m (5' 3.5\") 96% 25.49 kg/m2        Blood Pressure from Last 3 Encounters:   17 111/73   17 106/72   17 109/68    Weight from Last 3 Encounters:   17 66.3 kg (146 lb 3.2 oz)   17 67.3 kg (148 lb 6.4 oz)   17 68.5 kg (151 lb)               Primary Care Provider " Office Phone # Fax #    NELIA Hugo [facility] [phone] [address] [place] [address]        Equal Access to Services     GLORIAGEOFFREY DENNISE : Hadii noe ku baltao Sonachoali, waaxda luqadaha, qaybta kaalmada adeegyada, mario barriosyodit cook. So [facility] [phone].    ATENCIÓN: Si habla español, tiene a rodriguez disposición servicios gratuitos de asistencia lingüística. Llame al [phone].    We comply with applicable federal civil rights laws and [place] laws. We do not discriminate on the basis of race, color, national origin, age, disability, sex, sexual orientation, or gender identity.            Thank you!     Thank you for choosing [facility] HEPATOLOG[facility]  for your care. Our goal is always to provide you with excellent care. Hearing back from our patients is one way we can continue to improve our services. Please take a few minutes to complete the written survey that you may receive in the mail after your visit with us. Thank you!             Your Updated Medication List - Protect others around you: Learn how to safely use, store and throw away your medicines at www.disposemymeds.org.          This list is accurate as of: 11/21/17 11:59 PM.  Always use your most recent med list.                   Brand Name Dispense Instructions for use Diagnosis    medical cannabis (Patient's own supply.  Not a prescription)      1 capsule daily (This is NOT a prescription, and does not certify that the patient has a qualifying medical condition for medical cannabis.  The purpose of this order is  to document that the patient reports taking medical cannabis.)        methyl salicylate liquid      Apply topically as needed for moderate to severe pain (Max Freeze)        * UNABLE TO FIND      MEDICATION NAME: Iodine, 3 drops on tongue once per day        * UNABLE TO FIND      MEDICATION NAME: Diatomaceous        * Notice:  This list has 2 medication(s) that are the same as other  medications prescribed for you. Read the directions carefully, and ask your doctor or other care provider to review them with you.

## 2017-11-21 NOTE — PROGRESS NOTES
"Assessment/Plan    # Chronic hepatitis C   Genotype 3a, previous treatment interferon-based. Has now completed treatment with Epclusa. Imaging in May consistent with stage 3 fibrosis. MELD-Na 8 today.   - Return to clinic in 6 months with labs  - US and blood work every 6 months for HCC screening  - Labs at 3 months to evaluate for sustained virologic response     The patient was seen and discussed with the attending GI staff, Dr. So.     Murali Fernandez MD  PGY-1, Internal Medicine  P [phone]    The patient was seen and examined.  The above assessment and plan was developed jointly with the resident.      David So MD      Professor of Medicine  [employer]      Executive Medical Director, Solid Organ Transplant Program  [facility]     Subjective    Parisa Wong returns to clinic for follow-up of chronic hepatitis C.    Since her last visit, she has completed treatment with Epclusa. She reports that now that she has finished (7th of November), she didn't know just how tired/exhausted  she was while she was on it. Notes that she is less achy as well.     Overall, she reports that she is feeling \"pretty good.\" Prior to taking the Epclusa, she had RUQ pain; this went away right after she started treatment, along with her itching. She says she's still tired, but that she thinks this has more to do with the time of year and the weather than anything.     Appetite is good. Lost a lot of weight starting in 2015, but this had more to do with stopping drinking (November 2016) and changing her diet.      She denies any fevers or chills, cough or shortness of breath. She denies any nausea or vomiting, or constipation. Went out to eat for lunch 4-6 weeks ago, had diarrhea following; nothing since.     Not interested in flu shot. Would be interested in tetanus shot.    Objective  /73  Pulse 70  Temp 98.4  F (36.9  C) (Oral)  Ht 1.613 m (5' 3.5\")  Wt " 66.3 kg (146 lb 3.2 oz)  SpO2 96%  BMI 25.49 kg/m2    Physical Exam  HEENT: NC/AT. No scleral icterus. PERRL, EOMI. Oropharynx non-erythematous, no lesions appreciated. No lymphadenopathy.   Resp: CTAB, no adventitious sounds  CV: RRR, no M/R/G  Abd: Soft, non-tender, non-distended. BS+, normoactive.  Extremities: warm, well-perfused, no LE edema  Skin: No rashes or jaundice  Neuro: CNII-XII intact. No asterixis.    Orders Only on 11/21/2017   Component Date Value Ref Range Status     Sodium 11/21/2017 139  133 - 144 mmol/L Final     Potassium 11/21/2017 3.7  3.4 - 5.3 mmol/L Final     Chloride 11/21/2017 105  94 - 109 mmol/L Final     Carbon Dioxide 11/21/2017 27  20 - 32 mmol/L Final     Anion Gap 11/21/2017 7  3 - 14 mmol/L Final     Glucose 11/21/2017 89  70 - 99 mg/dL Final     Urea Nitrogen 11/21/2017 14  7 - 30 mg/dL Final     Creatinine 11/21/2017 0.65  0.52 - 1.04 mg/dL Final     GFR Estimate 11/21/2017 >90  >60 mL/min/1.7m2 Final    Non  GFR Calc     GFR Estimate If Black 11/21/2017 >90  >60 mL/min/1.7m2 Final    African American GFR Calc     Calcium 11/21/2017 8.8  8.5 - 10.1 mg/dL Final     Bilirubin Direct 11/21/2017 <0.1  0.0 - 0.2 mg/dL Final     Bilirubin Total 11/21/2017 0.3  0.2 - 1.3 mg/dL Final     Albumin 11/21/2017 3.8  3.4 - 5.0 g/dL Final     Protein Total 11/21/2017 7.1  6.8 - 8.8 g/dL Final     Alkaline Phosphatase 11/21/2017 148  40 - 150 U/L Final     ALT 11/21/2017 26  0 - 50 U/L Final     AST 11/21/2017 21  0 - 45 U/L Final     MELD-Na score: 8 at 7/18/2017  7:12 AM  MELD score: 8 at 7/18/2017  7:12 AM  Calculated from:  Serum Creatinine: 0.68 mg/dL (Rounded to 1) at 7/18/2017  7:12 AM  Serum Sodium: 138 mmol/L (Rounded to 137) at 7/18/2017  7:12 AM  Total Bilirubin: 1.1 mg/dL at 7/18/2017  7:12 AM  INR(ratio): 1.09 at 7/18/2017  7:12 AM  Age: 52 years    Imaging  US Abdomen 6/21/2017 Impression: 1.  Cholelithiasis. Minimal pericholecystic fluid may be related to liver  parenchymal disease, though cholecystitis cannot be definitively excluded. Otherwise no features of acute cholecystitis. 2.  No focal liver lesion. 3.  Borderline splenomegaly.

## 2017-11-23 LAB
HCV RNA SERPL NAA+PROBE-ACNC: NORMAL [IU]/ML
HCV RNA SERPL NAA+PROBE-LOG IU: NORMAL LOG IU/ML

## 2018-05-16 ENCOUNTER — OFFICE VISIT (OUTPATIENT)
Dept: GASTROENTEROLOGY | Facility: CLINIC | Age: 53
End: 2018-05-16
Attending: INTERNAL MEDICINE
Payer: COMMERCIAL

## 2018-05-16 ENCOUNTER — RADIANT APPOINTMENT (OUTPATIENT)
Dept: ULTRASOUND IMAGING | Facility: CLINIC | Age: 53
End: 2018-05-16
Attending: INTERNAL MEDICINE
Payer: COMMERCIAL

## 2018-05-16 VITALS
OXYGEN SATURATION: 96 % | DIASTOLIC BLOOD PRESSURE: 85 MMHG | HEIGHT: 64 IN | BODY MASS INDEX: 26.8 KG/M2 | HEART RATE: 68 BPM | WEIGHT: 157 LBS | SYSTOLIC BLOOD PRESSURE: 126 MMHG

## 2018-05-16 DIAGNOSIS — K74.60 CIRRHOSIS OF LIVER WITHOUT ASCITES, UNSPECIFIED HEPATIC CIRRHOSIS TYPE (H): Primary | ICD-10-CM

## 2018-05-16 DIAGNOSIS — B18.2 CHRONIC HEPATITIS C WITHOUT HEPATIC COMA (H): ICD-10-CM

## 2018-05-16 LAB
AFP SERPL-MCNC: 6.8 UG/L (ref 0–8)
ALBUMIN SERPL-MCNC: 4.4 G/DL (ref 3.4–5)
ALP SERPL-CCNC: 106 U/L (ref 40–150)
ALT SERPL W P-5'-P-CCNC: 26 U/L (ref 0–50)
ANION GAP SERPL CALCULATED.3IONS-SCNC: 7 MMOL/L (ref 3–14)
AST SERPL W P-5'-P-CCNC: 22 U/L (ref 0–45)
BILIRUB DIRECT SERPL-MCNC: 0.1 MG/DL (ref 0–0.2)
BILIRUB SERPL-MCNC: 0.6 MG/DL (ref 0.2–1.3)
BUN SERPL-MCNC: 14 MG/DL (ref 7–30)
CALCIUM SERPL-MCNC: 9.4 MG/DL (ref 8.5–10.1)
CHLORIDE SERPL-SCNC: 108 MMOL/L (ref 94–109)
CO2 SERPL-SCNC: 25 MMOL/L (ref 20–32)
CREAT SERPL-MCNC: 0.7 MG/DL (ref 0.52–1.04)
ERYTHROCYTE [DISTWIDTH] IN BLOOD BY AUTOMATED COUNT: 12.2 % (ref 10–15)
GFR SERPL CREATININE-BSD FRML MDRD: 87 ML/MIN/1.7M2
GLUCOSE SERPL-MCNC: 109 MG/DL (ref 70–99)
HCT VFR BLD AUTO: 41 % (ref 35–47)
HGB BLD-MCNC: 14.2 G/DL (ref 11.7–15.7)
INR PPP: 1.06 (ref 0.86–1.14)
MCH RBC QN AUTO: 33.1 PG (ref 26.5–33)
MCHC RBC AUTO-ENTMCNC: 34.6 G/DL (ref 31.5–36.5)
MCV RBC AUTO: 96 FL (ref 78–100)
PLATELET # BLD AUTO: 88 10E9/L (ref 150–450)
POTASSIUM SERPL-SCNC: 3.5 MMOL/L (ref 3.4–5.3)
PROT SERPL-MCNC: 7.5 G/DL (ref 6.8–8.8)
RBC # BLD AUTO: 4.29 10E12/L (ref 3.8–5.2)
SODIUM SERPL-SCNC: 140 MMOL/L (ref 133–144)
WBC # BLD AUTO: 5.7 10E9/L (ref 4–11)

## 2018-05-16 PROCEDURE — 87522 HEPATITIS C REVRS TRNSCRPJ: CPT | Performed by: INTERNAL MEDICINE

## 2018-05-16 PROCEDURE — G0463 HOSPITAL OUTPT CLINIC VISIT: HCPCS | Mod: ZF

## 2018-05-16 PROCEDURE — 80048 BASIC METABOLIC PNL TOTAL CA: CPT | Performed by: INTERNAL MEDICINE

## 2018-05-16 PROCEDURE — 82105 ALPHA-FETOPROTEIN SERUM: CPT | Performed by: INTERNAL MEDICINE

## 2018-05-16 PROCEDURE — 36415 COLL VENOUS BLD VENIPUNCTURE: CPT | Performed by: INTERNAL MEDICINE

## 2018-05-16 PROCEDURE — 85027 COMPLETE CBC AUTOMATED: CPT | Performed by: INTERNAL MEDICINE

## 2018-05-16 PROCEDURE — 80076 HEPATIC FUNCTION PANEL: CPT | Performed by: INTERNAL MEDICINE

## 2018-05-16 PROCEDURE — 85610 PROTHROMBIN TIME: CPT | Performed by: INTERNAL MEDICINE

## 2018-05-16 ASSESSMENT — PAIN SCALES - GENERAL: PAINLEVEL: NO PAIN (0)

## 2018-05-16 NOTE — PROGRESS NOTES
"                     HEPATOLOGY CLINIC      SUBJECTIVE:  Parisa Wong is a 53 year old woman with history of HCV (genotype 3A) status post interferon treatment and most recently Epclusa treatment, presenting for follow-up of her HCV cirrhosis.  The patient finished treatment with Epclusa in November 2017 since then, she has had near resolution of her right upper quadrant pain, and her itching has resolved as well.  The patient notes that her fatigue is also improved.  She endorses poor sleep, but her energy level is mostly intact.  Still endorses easy bruising.  No confusion or forgetfulness.  Appetite is good.  Had a brief episode of what sounds like viral gastroenteritis on April 26 after having a few drinks with friends, with nausea, vomiting and diarrhea the next day.  Has mild lightheadedness intermittently when getting up suddenly, though she does endorse good oral fluid intake.  The patient denies headache, vision changes, fever, chills, riders, cough, nausea, vomiting, dizziness, dysphagia, odynophagia, chest pain, shortness of breath, palpitations, abdominal pain, diarrhea, constipation, hematochezia, melena, dysuria, hematuria, or joint pain.    Medications and allergies reviewed by me today.     ROS:   10 point review of systems negative unless otherwise specified above.    OBJECTIVE:    /85  Pulse 68  Ht 1.613 m (5' 3.5\")  Wt 71.2 kg (157 lb)  SpO2 96%  BMI 27.38 kg/m2   Wt Readings from Last 1 Encounters:   05/16/18 71.2 kg (157 lb)     General: Pleasant middle-aged woman in NAD  HEENT: AT/NC, PERRL, anicteric sclerae, conjunctivae without injection, benign oropharynx, MMM  Neck: Supple, no palpable lymphadenopathy  Cardiovascular: RRR, normal S1-S2, no M/R/G, 2+ peripheral pulses, no peripheral edema  Respiratory: LCTAB, no W/R/R, normal respiratory effort   Abdominal: Soft, NTND, normal bowel sounds, no hepatosplenomegaly  Musculoskeletal: Normal bulk, no appreciable joint " deformities  Neurologic: CN II through XII intact, no asterixis, no focal deficits, AAOx4    LABORATORY RESULTS:  Recent Labs   Lab Test  05/16/18   1309  11/21/17   1003   NA  140  139   POTASSIUM  3.5  3.7   CHLORIDE  108  105   CO2  25  27   ANIONGAP  7  7   GLC  109*  89   BUN  14  14   CR  0.70  0.65   PAULY  9.4  8.8     Liver Function Studies -   Recent Labs   Lab Test  05/16/18   1309   PROTTOTAL  7.5   ALBUMIN  4.4   BILITOTAL  0.6   ALKPHOS  106   AST  22   ALT  26     CBC RESULTS:   Recent Labs   Lab Test  05/16/18   1309   WBC  5.7   RBC  4.29   HGB  14.2   HCT  41.0   MCV  96   MCH  33.1*   MCHC  34.6   RDW  12.2   PLT  88*     Alpha fetoprotein:  6.8    RADIOLOGY:  US ABDOMEN COMPLETE,  5/16/2018  Impression:   1.  Mild coarse heterogeneous echotexture of the liver parenchyma with  mild nodular contours which could be seen in hepatic intrinsic  disease. No focal liver lesions.  2.  2.6 cm gallstone. No associated sonographic findings of acute  cholecystitis.     ASSESSMENT/PLAN:  Parisa Wong is a 53 year old woman with history of HCV (genotype 3A) status post interferon treatment and most recently Epclusa treatment, presenting for follow-up of her HCV cirrhosis. The patient's labs are stable currently.    Cirrhosis of liver without ascites, unspecified hepatic cirrhosis type (H): MELD score is 7, Child-Reeves class A. Labs are stable currently. HCV RNA level pending currently, last level was negative. Alpha fetoprotein is within normal numbers. Continues with thrombocytopenia, likely due to her cirrhosis. Radiology findings do not suggest overt progression of cirrhosis. Previous negative HCV RNA levels indicated sustained virologic response. Pending negative HCV RNA levels indicating HCV cure, the patient should return for follow up in 6 months.  -     Hepatic Panel [LAB20]; Future  -     Basic metabolic panel [LAB15]; Future  -     CBC with platelets [[id number]]; Future  -     INR [[id number]]; Future  -      AFP tumor marker [[id number]]; Future  -     US abdomen complete [SJE560]; Standing    Other orders  -     Schedule follow up appointments     The patient should return to clinic for follow-up with Dr. David So in 6 months.    Kuldeep Proctor MD PhD  May 16, 2018    The patient was seen and discussed with Dr. David So, who agrees with the assessment and plan.    The patient was seen and examined.  The above assessment and plan was developed jointly with the fellow.      David So MD      Professor of Medicine  [employer]      Executive Medical Director, Solid Organ Transplant Program  [facility]

## 2018-05-16 NOTE — NURSING NOTE
"Chief Complaint   Patient presents with     RECHECK     Hep C      /85  Pulse 68  Ht 1.613 m (5' 3.5\")  Wt 71.2 kg (157 lb)  SpO2 96%  BMI 27.38 kg/m2  Flip Lynch, JOSELINE    "

## 2018-05-16 NOTE — LETTER
"5/16/2018      RE: Parisa Wong  [address] [facility] [address]                            HEPATOLOGY CLINIC      SUBJECTIVE:  Parisa Wong is a 53 year old woman with history of HCV (genotype 3A) status post interferon treatment and most recently Epclusa treatment, presenting for follow-up of her HCV cirrhosis.  The patient finished treatment with Epclusa in November 2017 since then, she has had near resolution of her right upper quadrant pain, and her itching has resolved as well.  The patient notes that her fatigue is also improved.  She endorses poor sleep, but her energy level is mostly intact.  Still endorses easy bruising.  No confusion or forgetfulness.  Appetite is good.  Had a brief episode of what sounds like viral gastroenteritis on April 26 after having a few drinks with friends, with nausea, vomiting and diarrhea the next day.  Has mild lightheadedness intermittently when getting up suddenly, though she does endorse good oral fluid intake.  The patient denies headache, vision changes, fever, chills, riders, cough, nausea, vomiting, dizziness, dysphagia, odynophagia, chest pain, shortness of breath, palpitations, abdominal pain, diarrhea, constipation, hematochezia, melena, dysuria, hematuria, or joint pain.    Medications and allergies reviewed by me today.     ROS:   10 point review of systems negative unless otherwise specified above.    OBJECTIVE:    /85  Pulse 68  Ht 1.613 m (5' 3.5\")  Wt 71.2 kg (157 lb)  SpO2 96%  BMI 27.38 kg/m2   Wt Readings from Last 1 Encounters:   05/16/18 71.2 kg (157 lb)     General: Pleasant middle-aged woman in NAD  HEENT: AT/NC, PERRL, anicteric sclerae, conjunctivae without injection, benign oropharynx, MMM  Neck: Supple, no palpable lymphadenopathy  Cardiovascular: RRR, normal S1-S2, no M/R/G, 2+ peripheral pulses, no peripheral edema  Respiratory: LCTAB, no W/R/R, normal respiratory effort   Abdominal: Soft, NTND, normal bowel sounds, no " hepatosplenomegaly  Musculoskeletal: Normal bulk, no appreciable joint deformities  Neurologic: CN II through XII intact, no asterixis, no focal deficits, AAOx4    LABORATORY RESULTS:  Recent Labs   Lab Test  05/16/18   1309  11/21/17   1003   NA  140  139   POTASSIUM  3.5  3.7   CHLORIDE  108  105   CO2  25  27   ANIONGAP  7  7   GLC  109*  89   BUN  14  14   CR  0.70  0.65   PAULY  9.4  8.8     Liver Function Studies -   Recent Labs   Lab Test  05/16/18   1309   PROTTOTAL  7.5   ALBUMIN  4.4   BILITOTAL  0.6   ALKPHOS  106   AST  22   ALT  26     CBC RESULTS:   Recent Labs   Lab Test  05/16/18   1309   WBC  5.7   RBC  4.29   HGB  14.2   HCT  41.0   MCV  96   MCH  33.1*   MCHC  34.6   RDW  12.2   PLT  88*     Alpha fetoprotein:  6.8    RADIOLOGY:  US ABDOMEN COMPLETE,  5/16/2018  Impression:   1.  Mild coarse heterogeneous echotexture of the liver parenchyma with  mild nodular contours which could be seen in hepatic intrinsic  disease. No focal liver lesions.  2.  2.6 cm gallstone. No associated sonographic findings of acute  cholecystitis.     ASSESSMENT/PLAN:  Parisa Wong is a 53 year old woman with history of HCV (genotype 3A) status post interferon treatment and most recently Epclusa treatment, presenting for follow-up of her HCV cirrhosis. The patient's labs are stable currently.    Cirrhosis of liver without ascites, unspecified hepatic cirrhosis type (H): MELD score is 7, Child-Reeves class A. Labs are stable currently. HCV RNA level pending currently, last level was negative. Alpha fetoprotein is within normal numbers. Continues with thrombocytopenia, likely due to her cirrhosis. Radiology findings do not suggest overt progression of cirrhosis. Previous negative HCV RNA levels indicated sustained virologic response. Pending negative HCV RNA levels indicating HCV cure, the patient should return for follow up in 6 months.  -     Hepatic Panel [LAB20]; Future  -     Basic metabolic panel [LAB15]; Future  -      CBC with platelets [EZE360]; Future  -     INR [[id number]]; Future  -     AFP tumor marker [[id number]]; Future  -     US abdomen complete [HBS064]; Standing    Other orders  -     Schedule follow up appointments     The patient should return to clinic for follow-up with Dr. David So in 6 months.    Kuldeep Proctor MD PhD  May 16, 2018    The patient was seen and discussed with Dr. David So, who agrees with the assessment and plan.    The patient was seen and examined.  The above assessment and plan was developed jointly with the fellow.      David So MD      Professor of Medicine  [facility] [employer]      Executive Medical Director, Solid Organ Transplant Program  [facility]

## 2018-05-16 NOTE — MR AVS SNAPSHOT
After Visit Summary   5/16/2018    Parisa Wong    MRN: [id number]           Patient Information     Date Of Birth          1965        Visit Information        Provider Department      5/16/2018 2:15 PM David So MD [facility] Hepatology        Today's Diagnoses     Cirrhosis of liver without ascites, unspecified hepatic cirrhosis type (H)    -  1       Follow-ups after your visit        Follow-up notes from your care team     Return in about 6 months (around 11/16/2018).      Your next 10 appointments already scheduled     Nov 13, 2018  7:15 AM CST   Lab with  LAB   [facility] Lab ([facility])    [address] [place] [address] [phone]            Nov 13, 2018  7:45 AM CST   US ABDOMEN COMPLETE with [address] [place] [address] Imaging Center US ([facility])    [address] [place] [address] [phone]           Please bring a list of your medicines (including vitamins, minerals and over-the-counter drugs). Also, tell your doctor about any allergies you may have. Wear comfortable clothes and leave your valuables at home.  Adults: No eating or drinking for 8 hours before the exam. You may take medicine with a small sip of water.  Children: - Children 6+ years: No food or drink for 6 hours before exam. - Children 1-5 years: No food or drink for 4 hours before exam. - Infants, breast-fed: may have breast milk up to 2 hours before exam. - Infants, formula: may have bottle until 4 hours before exam.  Please call the Imaging Department at your exam site with any questions.            Nov 13, 2018  8:45 AM CST   (Arrive by 8:30 AM)   Return General Liver with David So MD   [facility] Hepatology ([facility])    [address] [place] [address]  [address] [place] [address] [phone]              Future tests that were ordered for you today     Open Standing Orders         "Priority Remaining Interval Expires Ordered    US abdomen complete [HFR227] Routine 2/2 Every 6 Months 5/16/2019 5/16/2018          Open Future Orders        Priority Expected Expires Ordered    Hepatic Panel [LAB20] Routine 11/12/2018 5/16/2019 5/16/2018    Basic metabolic panel [LAB15] Routine 11/12/2018 5/16/2019 5/16/2018    CBC with platelets [SDY717] Routine 11/12/2018 5/16/2019 5/16/2018    INR [[id number]] Routine 11/12/2018 5/16/2019 5/16/2018    AFP tumor marker [ZJI021] Routine 11/12/2018 5/16/2019 5/16/2018            Who to contact     If you have questions or need follow up information about today's clinic visit or your schedule please contact [facility] HEPATOLOGY directly at [phone].  Normal or non-critical lab and imaging results will be communicated to you by [employer], letter or phone within 4 business days after the clinic has received the results. If you do not hear from us within 7 days, please contact the clinic through M[employer] or phone. If you have a critical or abnormal lab result, we will notify you by phone as soon as possible.  Submit refill requests through [employer] or call your pharmacy and they will forward the refill request to us. Please allow 3 business days for your refill to be completed.          Additional Information About Your Visit        MyChart Information     [employer] gives you secure access to your electronic health record. If you see a primary care provider, you can also send messages to your care team and make appointments. If you have questions, please call your primary care clinic.  If you do not have a primary care provider, please call [phone] and they will assist you.        Care EveryWhere ID     This is your Care EveryWhere ID. This could be used by other organizations to access your [place] medical records  [id number]        Your Vitals Were     Pulse Height Pulse Oximetry BMI (Body Mass Index)          68 1.613 m (5' 3.5\") 96% 27.38 kg/m2         Blood " Pressure from Last 3 Encounters:   05/16/18 126/85   11/21/17 111/73   09/26/17 106/72    Weight from Last 3 Encounters:   05/16/18 71.2 kg (157 lb)   11/21/17 66.3 kg (146 lb 3.2 oz)   07/18/17 67.3 kg (148 lb 6.4 oz)              We Performed the Following     Schedule follow up appointments        Primary Care Provider Office Phone # Fax #    Imani Bah Ramos Puentes, APRN [facility] [phone] [address] [place] [address]        Equal Access to Services     [facility]: Hadii aad ku hadasho Soomaali, waaxda luqadaha, qaybta kaalmada adeegyada, mario carrasquillo hayyodit keen . So [facility] [phone].    ATENCIÓN: Si habla español, tiene a rodriguez disposición servicios gratuitos de asistencia lingüística. [facility] [phone].    We comply with applicable federal civil rights laws and [place] laws. We do not discriminate on the basis of race, color, national origin, age, disability, sex, sexual orientation, or gender identity.            Thank you!     Thank you for choosing [facility] HEPATOLOGY  for your care. Our goal is always to provide you with excellent care. Hearing back from our patients is one way we can continue to improve our services. Please take a few minutes to complete the written survey that you may receive in the mail after your visit with us. Thank you!             Your Updated Medication List - Protect others around you: Learn how to safely use, store and throw away your medicines at www.disposemymeds.org.          This list is accurate as of 5/16/18  3:10 PM.  Always use your most recent med list.                   Brand Name Dispense Instructions for use Diagnosis    medical cannabis (Patient's own supply.  Not a prescription)      1 capsule daily (This is NOT a prescription, and does not certify that the patient has a qualifying medical condition for medical cannabis.  The purpose of this order is  to document that the patient reports taking medical cannabis.)         methyl salicylate liquid      Apply topically as needed for moderate to severe pain (Max Freeze)        * UNABLE TO FIND      MEDICATION NAME: Iodine, 3 drops on tongue once per day        * UNABLE TO FIND      MEDICATION NAME: Diatomaceous        UNABLE TO FIND      MEDICATION NAME: Protandim        * Notice:  This list has 2 medication(s) that are the same as other medications prescribed for you. Read the directions carefully, and ask your doctor or other care provider to review them with you.

## 2018-05-17 LAB
HCV RNA SERPL NAA+PROBE-ACNC: NORMAL [IU]/ML
HCV RNA SERPL NAA+PROBE-LOG IU: NORMAL LOG IU/ML

## 2018-05-27 ENCOUNTER — HEALTH MAINTENANCE LETTER (OUTPATIENT)
Age: 53
End: 2018-05-27

## 2018-11-08 DIAGNOSIS — K74.60 CIRRHOSIS (H): Primary | ICD-10-CM

## 2018-11-20 ENCOUNTER — RADIANT APPOINTMENT (OUTPATIENT)
Dept: ULTRASOUND IMAGING | Facility: CLINIC | Age: 53
End: 2018-11-20
Attending: INTERNAL MEDICINE
Payer: COMMERCIAL

## 2018-11-20 ENCOUNTER — OFFICE VISIT (OUTPATIENT)
Dept: GASTROENTEROLOGY | Facility: CLINIC | Age: 53
End: 2018-11-20
Attending: INTERNAL MEDICINE
Payer: COMMERCIAL

## 2018-11-20 VITALS
SYSTOLIC BLOOD PRESSURE: 158 MMHG | HEART RATE: 84 BPM | TEMPERATURE: 98.8 F | OXYGEN SATURATION: 97 % | WEIGHT: 162.2 LBS | BODY MASS INDEX: 28.28 KG/M2 | DIASTOLIC BLOOD PRESSURE: 83 MMHG

## 2018-11-20 DIAGNOSIS — K74.60 CIRRHOSIS OF LIVER WITHOUT ASCITES, UNSPECIFIED HEPATIC CIRRHOSIS TYPE (H): ICD-10-CM

## 2018-11-20 DIAGNOSIS — K74.60 CIRRHOSIS (H): ICD-10-CM

## 2018-11-20 DIAGNOSIS — K74.60 CIRRHOSIS OF LIVER WITHOUT ASCITES, UNSPECIFIED HEPATIC CIRRHOSIS TYPE (H): Primary | ICD-10-CM

## 2018-11-20 LAB
AFP SERPL-MCNC: 6.1 UG/L (ref 0–8)
ALBUMIN SERPL-MCNC: 4.1 G/DL (ref 3.4–5)
ALP SERPL-CCNC: 100 U/L (ref 40–150)
ALT SERPL W P-5'-P-CCNC: 24 U/L (ref 0–50)
ANION GAP SERPL CALCULATED.3IONS-SCNC: 7 MMOL/L (ref 3–14)
AST SERPL W P-5'-P-CCNC: 19 U/L (ref 0–45)
BILIRUB DIRECT SERPL-MCNC: 0.2 MG/DL (ref 0–0.2)
BILIRUB SERPL-MCNC: 0.6 MG/DL (ref 0.2–1.3)
BUN SERPL-MCNC: 12 MG/DL (ref 7–30)
CALCIUM SERPL-MCNC: 9.1 MG/DL (ref 8.5–10.1)
CHLORIDE SERPL-SCNC: 105 MMOL/L (ref 94–109)
CHOLEST SERPL-MCNC: 188 MG/DL
CO2 SERPL-SCNC: 28 MMOL/L (ref 20–32)
CREAT SERPL-MCNC: 0.71 MG/DL (ref 0.52–1.04)
ERYTHROCYTE [DISTWIDTH] IN BLOOD BY AUTOMATED COUNT: 12.4 % (ref 10–15)
GFR SERPL CREATININE-BSD FRML MDRD: 86 ML/MIN/1.7M2
GLUCOSE SERPL-MCNC: 94 MG/DL (ref 70–99)
HCT VFR BLD AUTO: 40.5 % (ref 35–47)
HDLC SERPL-MCNC: 90 MG/DL
HGB BLD-MCNC: 13.6 G/DL (ref 11.7–15.7)
INR PPP: 1.01 (ref 0.86–1.14)
LDLC SERPL CALC-MCNC: 84 MG/DL
MCH RBC QN AUTO: 32.9 PG (ref 26.5–33)
MCHC RBC AUTO-ENTMCNC: 33.6 G/DL (ref 31.5–36.5)
MCV RBC AUTO: 98 FL (ref 78–100)
NONHDLC SERPL-MCNC: 98 MG/DL
PLATELET # BLD AUTO: 84 10E9/L (ref 150–450)
POTASSIUM SERPL-SCNC: 3.8 MMOL/L (ref 3.4–5.3)
PROT SERPL-MCNC: 7.6 G/DL (ref 6.8–8.8)
RBC # BLD AUTO: 4.14 10E12/L (ref 3.8–5.2)
SODIUM SERPL-SCNC: 140 MMOL/L (ref 133–144)
TRIGL SERPL-MCNC: 70 MG/DL
WBC # BLD AUTO: 4.8 10E9/L (ref 4–11)

## 2018-11-20 PROCEDURE — 80076 HEPATIC FUNCTION PANEL: CPT | Performed by: INTERNAL MEDICINE

## 2018-11-20 PROCEDURE — 82105 ALPHA-FETOPROTEIN SERUM: CPT | Performed by: INTERNAL MEDICINE

## 2018-11-20 PROCEDURE — 85610 PROTHROMBIN TIME: CPT | Performed by: INTERNAL MEDICINE

## 2018-11-20 PROCEDURE — 36415 COLL VENOUS BLD VENIPUNCTURE: CPT | Performed by: INTERNAL MEDICINE

## 2018-11-20 PROCEDURE — 80061 LIPID PANEL: CPT | Performed by: INTERNAL MEDICINE

## 2018-11-20 PROCEDURE — 85027 COMPLETE CBC AUTOMATED: CPT | Performed by: INTERNAL MEDICINE

## 2018-11-20 PROCEDURE — 80048 BASIC METABOLIC PNL TOTAL CA: CPT | Performed by: INTERNAL MEDICINE

## 2018-11-20 PROCEDURE — G0463 HOSPITAL OUTPT CLINIC VISIT: HCPCS | Mod: ZF

## 2018-11-20 ASSESSMENT — PAIN SCALES - GENERAL: PAINLEVEL: NO PAIN (0)

## 2018-11-20 NOTE — MR AVS SNAPSHOT
After Visit Summary   11/20/2018    Parisa Wong    MRN: [id number]           Patient Information     Date Of Birth          1965        Visit Information        Provider Department      11/20/2018 8:45 AM David So MD [facility] Hepatology        Today's Diagnoses     Cirrhosis of liver without ascites, unspecified hepatic cirrhosis type (H)    -  1       Follow-ups after your visit        Follow-up notes from your care team     Return in about 6 months (around 5/20/2019).      Your next 10 appointments already scheduled     May 21, 2019  7:15 AM CDT   Lab with  LAB   [facility] Lab ([facility])    [address] [id number] [phone]            May 21, 2019  7:45 AM CDT   (Arrive by 7:30 AM)   US ABDOMEN COMPLETE with [address] [place] [address] Imaging Center US ([facility])    [address] [id number] [phone]           How do I prepare for my exam? (Food and drink instructions) Adults: No eating, smoking, gum chewing or drinking for 8 hours before the exam. You may take medicine with a small sip of water.  Children: * Infants, breast-fed: may have breast milk up to 2 hours before exam. * Infants, formula: may have bottle until 4 hours before exam. * Children 1-5 years: No food or drink for 4 hours before exam. * Children 6 -12 years: No food or drink for 6 hours before exam. * Children over 12 years: No food or drink for 8 hours before exam.  * J Tube Fed: No need to stop feedings.  What should I wear: Wear comfortable clothes.  How long does the exam take: Most ultrasounds take 30 to 60 minutes.  What should I bring: Bring a list of your medicines, including vitamins, minerals and over-the-counter drugs. It is safest to leave personal items at home.  Do I need a :  No  is needed.  What do I need to tell my doctor: Tell your doctor about any allergies you may  have.  What should I do after the exam: No restrictions, You may resume normal activities.  What is this test: An ultrasound uses sound waves to make pictures of the body. Sound waves do not cause pain. The only discomfort may be the pressure of the wand against your skin or full bladder.  Who should I call with questions: If you have any questions, please call the Imaging Department where you will have your exam. Directions, parking instructions, and other information is available on our website, [email or web address].            May 21, 2019  8:45 AM CDT   (Arrive by 8:30 AM)   Return General Liver with David So MD   [facility] Hepatology ([facility])    [address] [facility]  [address] [place] [address] [phone]              Future tests that were ordered for you today     Open Standing Orders        Priority Remaining Interval Expires Ordered    US abdomen complete [JYB361] Routine 2/2 Every 6 Months 11/20/2019 11/20/2018          Open Future Orders        Priority Expected Expires Ordered    Hepatic Panel [LAB20] Routine 5/19/2019 11/20/2019 11/20/2018    Basic metabolic panel [LAB15] Routine 5/19/2019 11/20/2019 11/20/2018    CBC with platelets [KZK006] Routine 5/19/2019 11/20/2019 11/20/2018    INR [[id number]] Routine 5/19/2019 11/20/2019 11/20/2018    AFP tumor marker [AOI664] Routine 5/19/2019 11/20/2019 11/20/2018            Who to contact     If you have questions or need follow up information about today's clinic visit or your schedule please contact [facility] HEPATOLOGY directly at [phone].  Normal or non-critical lab and imaging results will be communicated to you by MyChart, letter or phone within 4 business days after the clinic has received the results. If you do not hear from us within 7 days, please contact the clinic through MyChart or phone. If you have a critical or abnormal lab result, we will notify you by phone as soon as possible.  Submit refill  requests through [employer] or call your pharmacy and they will forward the refill request to us. Please allow 3 business days for your refill to be completed.          Additional Information About Your Visit        Mobicowhart Information     [employer] gives you secure access to your electronic health record. If you see a primary care provider, you can also send messages to your care team and make appointments. If you have questions, please call your primary care clinic.  If you do not have a primary care provider, please call [phone] and they will assist you.        Care EveryWhere ID     This is your Care EveryWhere ID. This could be used by other organizations to access your [place] medical records  [id number]        Your Vitals Were     Pulse Temperature Pulse Oximetry BMI (Body Mass Index)          84 98.8  F (37.1  C) (Oral) 97% 28.28 kg/m2         Blood Pressure from Last 3 Encounters:   11/20/18 158/83   05/16/18 126/85   11/21/17 111/73    Weight from Last 3 Encounters:   11/20/18 73.6 kg (162 lb 3.2 oz)   05/16/18 71.2 kg (157 lb)   11/21/17 66.3 kg (146 lb 3.2 oz)              We Performed the Following     Schedule follow up appointments        Primary Care Provider Office Phone # Fax #    NELIA Hugo [facility] [phone] [address] [place] [address]        Equal Access to Services     [facility]: Hadii aad ku hadasho Sonachoali, waaxda luqadaha, qaybta kaalmada adeegyada, mario keen . So [facility] [phone].    ATENCIÓN: Si habla español, tiene a rodriguez disposición servicios gratuitos de asistencia lingüística. Llguerda al [phone].    We comply with applicable federal civil rights laws and [place] laws. We do not discriminate on the basis of race, color, national origin, age, disability, sex, sexual orientation, or gender identity.            Thank you!     Thank you for choosing [facility] HEPATOLOGY  for your care. Our goal is always  to provide you with excellent care. Hearing back from our patients is one way we can continue to improve our services. Please take a few minutes to complete the written survey that you may receive in the mail after your visit with us. Thank you!             Your Updated Medication List - Protect others around you: Learn how to safely use, store and throw away your medicines at www.disposemymeds.org.          This list is accurate as of 11/20/18  9:28 AM.  Always use your most recent med list.                   Brand Name Dispense Instructions for use Diagnosis    medical cannabis (Patient's own supply.  Not a prescription)      1 capsule daily (This is NOT a prescription, and does not certify that the patient has a qualifying medical condition for medical cannabis.  The purpose of this order is  to document that the patient reports taking medical cannabis.)        methyl salicylate liquid      Apply topically as needed for moderate to severe pain (Max Freeze)        * UNABLE TO FIND      MEDICATION NAME: Iodine, 3 drops on tongue once per day        * UNABLE TO FIND      MEDICATION NAME: Diatomaceous        UNABLE TO FIND      MEDICATION NAME: Protandim        * Notice:  This list has 2 medication(s) that are the same as other medications prescribed for you. Read the directions carefully, and ask your doctor or other care provider to review them with you.

## 2018-11-20 NOTE — PROGRESS NOTES
I had the pleasure of seeing Parisa Wong for followup in the Liver Clinic at the [facility] on 11/20/2018.  Ms. 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  She is a sustained virologic responder to hepatitis C treatment.  Her last fibrosis scan does suggest that she now only has F3 fibrosis.      She feels well.  She still notes some very mild and very occasional right upper quadrant pain.  She denies any itching, skin rash or fatigue.  She denies any increased abdominal girth or lower extremity edema.  She denies any fevers or chills, cough or shortness of breath.  She denies any nausea or vomiting, diarrhea or constipation.  Her appetite has been good, and her weight has been stable.  There have been no other new events since she was last seen.     Current Outpatient Prescriptions   Medication     medical cannabis (Patient's own supply.  Not a prescription)     UNABLE TO FIND     methyl salicylate liquid     UNABLE TO FIND     UNABLE TO FIND     No current facility-administered medications for this visit.      B/P: 158/83, T: 98.8, P: 84, R: Data Unavailable    In general, she looks quite well.  HEENT exam shows no scleral icterus or temporal muscle wasting.  Chest is clear.  Abdominal exam shows no increase in girth.  No masses or tenderness to palpation are present.  Her liver is 10 cm in span without left lobe enlargement.  No spleen tip is palpable.  Extremity exam shows no edema.  Skin exam shows no stigmata of chronic liver disease.  Neurologic exam shows no asterixis.     Recent Results (from the past 168 hour(s))   Hepatic Panel [LAB20]    Collection Time: 11/20/18  7:31 AM   Result Value Ref Range    Bilirubin Direct 0.2 0.0 - 0.2 mg/dL    Bilirubin Total 0.6 0.2 - 1.3 mg/dL    Albumin 4.1 3.4 - 5.0 g/dL    Protein Total 7.6 6.8 - 8.8 g/dL    Alkaline Phosphatase 100 40 - 150 U/L    ALT 24 0 - 50 U/L    AST 19 0 - 45 U/L   Basic metabolic  panel [LAB15]    Collection Time: 11/20/18  7:31 AM   Result Value Ref Range    Sodium 140 133 - 144 mmol/L    Potassium 3.8 3.4 - 5.3 mmol/L    Chloride 105 94 - 109 mmol/L    Carbon Dioxide 28 20 - 32 mmol/L    Anion Gap 7 3 - 14 mmol/L    Glucose 94 70 - 99 mg/dL    Urea Nitrogen 12 7 - 30 mg/dL    Creatinine 0.71 0.52 - 1.04 mg/dL    GFR Estimate 86 >60 mL/min/1.7m2    GFR Estimate If Black >90 >60 mL/min/1.7m2    Calcium 9.1 8.5 - 10.1 mg/dL   CBC with platelets [[id number]]    Collection Time: 11/20/18  7:31 AM   Result Value Ref Range    WBC 4.8 4.0 - 11.0 10e9/L    RBC Count 4.14 3.8 - 5.2 10e12/L    Hemoglobin 13.6 11.7 - 15.7 g/dL    Hematocrit 40.5 35.0 - 47.0 %    MCV 98 78 - 100 fl    MCH 32.9 26.5 - 33.0 pg    MCHC 33.6 31.5 - 36.5 g/dL    RDW 12.4 10.0 - 15.0 %    Platelet Count 84 (L) 150 - 450 10e9/L   INR [[id number]]    Collection Time: 11/20/18  7:31 AM   Result Value Ref Range    INR 1.01 0.86 - 1.14   Lipid Profile    Collection Time: 11/20/18  7:31 AM   Result Value Ref Range    Cholesterol 188 <200 mg/dL    Triglycerides 70 <150 mg/dL    HDL Cholesterol 90 >49 mg/dL    LDL Cholesterol Calculated 84 <100 mg/dL    Non HDL Cholesterol 98 <130 mg/dL      I did review her ultrasound which shows a mildly coarsened liver with a nodular contour.  No mass lesions are seen, her spleen is normal size and there is no ascites.      IMPRESSION:  Ms. Wong has cirrhosis causing chronic hepatitis C.  I really would question whether she still has cirrhosis given that her fibrosis scan suggests stage 3 fibrosis, she has a normal spleen sign and I am certainly not going to do any screening endoscopies.  She did decline a flu vaccine today.      She will return in 6 months with repeat ultrasound and blood work.      Thank you very much for allowing me to participate in the care of this patient.  If you have any questions regarding my recommendations, please do not hesitate to contact me.       David So,  MD      Professor of Medicine  [employer]      Executive Medical Director, Solid Organ Transplant Program  [facility]

## 2018-11-20 NOTE — NURSING NOTE
Chief Complaint   Patient presents with     RECHECK     6 Month Follow Up Elevated Iron Levels     /83 (BP Location: Left arm)  Pulse 84  Temp 98.8  F (37.1  C) (Oral)  Wt 73.6 kg (162 lb 3.2 oz)  SpO2 97%  BMI 28.28 kg/m2   Darlene Paz

## 2018-11-20 NOTE — LETTER
11/20/2018      RE: Parisa Wong  [address] [facility] [address]       I had the pleasure of seeing Parisa Wong for followup in the Liver Clinic at the [facility] on 11/20/2018.  Ms. 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  She is a sustained virologic responder to hepatitis C treatment.  Her last fibrosis scan does suggest that she now only has F3 fibrosis.      She feels well.  She still notes some very mild and very occasional right upper quadrant pain.  She denies any itching, skin rash or fatigue.  She denies any increased abdominal girth or lower extremity edema.  She denies any fevers or chills, cough or shortness of breath.  She denies any nausea or vomiting, diarrhea or constipation.  Her appetite has been good, and her weight has been stable.  There have been no other new events since she was last seen.     Current Outpatient Prescriptions   Medication     medical cannabis (Patient's own supply.  Not a prescription)     UNABLE TO FIND     methyl salicylate liquid     UNABLE TO FIND     UNABLE TO FIND     No current facility-administered medications for this visit.      B/P: 158/83, T: 98.8, P: 84, R: Data Unavailable    In general, she looks quite well.  HEENT exam shows no scleral icterus or temporal muscle wasting.  Chest is clear.  Abdominal exam shows no increase in girth.  No masses or tenderness to palpation are present.  Her liver is 10 cm in span without left lobe enlargement.  No spleen tip is palpable.  Extremity exam shows no edema.  Skin exam shows no stigmata of chronic liver disease.  Neurologic exam shows no asterixis.     Recent Results (from the past 168 hour(s))   Hepatic Panel [LAB20]    Collection Time: 11/20/18  7:31 AM   Result Value Ref Range    Bilirubin Direct 0.2 0.0 - 0.2 mg/dL    Bilirubin Total 0.6 0.2 - 1.3 mg/dL    Albumin 4.1 3.4 - 5.0 g/dL    Protein Total 7.6 6.8 - 8.8 g/dL    Alkaline  Phosphatase 100 40 - 150 U/L    ALT 24 0 - 50 U/L    AST 19 0 - 45 U/L   Basic metabolic panel [LAB15]    Collection Time: 11/20/18  7:31 AM   Result Value Ref Range    Sodium 140 133 - 144 mmol/L    Potassium 3.8 3.4 - 5.3 mmol/L    Chloride 105 94 - 109 mmol/L    Carbon Dioxide 28 20 - 32 mmol/L    Anion Gap 7 3 - 14 mmol/L    Glucose 94 70 - 99 mg/dL    Urea Nitrogen 12 7 - 30 mg/dL    Creatinine 0.71 0.52 - 1.04 mg/dL    GFR Estimate 86 >60 mL/min/1.7m2    GFR Estimate If Black >90 >60 mL/min/1.7m2    Calcium 9.1 8.5 - 10.1 mg/dL   CBC with platelets [[id number]]    Collection Time: 11/20/18  7:31 AM   Result Value Ref Range    WBC 4.8 4.0 - 11.0 10e9/L    RBC Count 4.14 3.8 - 5.2 10e12/L    Hemoglobin 13.6 11.7 - 15.7 g/dL    Hematocrit 40.5 35.0 - 47.0 %    MCV 98 78 - 100 fl    MCH 32.9 26.5 - 33.0 pg    MCHC 33.6 31.5 - 36.5 g/dL    RDW 12.4 10.0 - 15.0 %    Platelet Count 84 (L) 150 - 450 10e9/L   INR [[id number]]    Collection Time: 11/20/18  7:31 AM   Result Value Ref Range    INR 1.01 0.86 - 1.14   Lipid Profile    Collection Time: 11/20/18  7:31 AM   Result Value Ref Range    Cholesterol 188 <200 mg/dL    Triglycerides 70 <150 mg/dL    HDL Cholesterol 90 >49 mg/dL    LDL Cholesterol Calculated 84 <100 mg/dL    Non HDL Cholesterol 98 <130 mg/dL      I did review her ultrasound which shows a mildly coarsened liver with a nodular contour.  No mass lesions are seen, her spleen is normal size and there is no ascites.      IMPRESSION:  Ms. Wong has cirrhosis causing chronic hepatitis C.  I really would question whether she still has cirrhosis given that her fibrosis scan suggests stage 3 fibrosis, she has a normal spleen sign and I am certainly not going to do any screening endoscopies.  She did decline a flu vaccine today.      She will return in 6 months with repeat ultrasound and blood work.      Thank you very much for allowing me to participate in the care of this patient.  If you have any questions  regarding my recommendations, please do not hesitate to contact me.       David So MD      Professor of Medicine  [facility] [employer]      Executive Medical Director, Solid Organ Transplant Program  [employer]    David So MD

## 2019-04-03 ENCOUNTER — DOCUMENTATION ONLY (OUTPATIENT)
Dept: CARE COORDINATION | Facility: CLINIC | Age: 54
End: 2019-04-03

## 2019-04-19 ENCOUNTER — HEALTH MAINTENANCE LETTER (OUTPATIENT)
Age: 54
End: 2019-04-19

## 2019-04-22 ENCOUNTER — OFFICE VISIT (OUTPATIENT)
Dept: FAMILY MEDICINE | Facility: CLINIC | Age: 54
End: 2019-04-22
Payer: COMMERCIAL

## 2019-04-22 VITALS
RESPIRATION RATE: 18 BRPM | HEIGHT: 64 IN | BODY MASS INDEX: 29.53 KG/M2 | DIASTOLIC BLOOD PRESSURE: 60 MMHG | TEMPERATURE: 97.3 F | WEIGHT: 173 LBS | HEART RATE: 60 BPM | OXYGEN SATURATION: 99 % | SYSTOLIC BLOOD PRESSURE: 112 MMHG

## 2019-04-22 DIAGNOSIS — Z12.31 VISIT FOR SCREENING MAMMOGRAM: ICD-10-CM

## 2019-04-22 DIAGNOSIS — B18.2 CHRONIC HEPATITIS C WITHOUT HEPATIC COMA (H): ICD-10-CM

## 2019-04-22 DIAGNOSIS — Z12.11 SPECIAL SCREENING FOR MALIGNANT NEOPLASMS, COLON: ICD-10-CM

## 2019-04-22 DIAGNOSIS — Z23 NEED FOR VACCINATION: ICD-10-CM

## 2019-04-22 DIAGNOSIS — Z86.0100 HISTORY OF COLONIC POLYPS: ICD-10-CM

## 2019-04-22 DIAGNOSIS — Z00.00 ROUTINE HISTORY AND PHYSICAL EXAMINATION OF ADULT: Primary | ICD-10-CM

## 2019-04-22 DIAGNOSIS — E55.9 VITAMIN D DEFICIENCY: ICD-10-CM

## 2019-04-22 DIAGNOSIS — E78.5 HYPERLIPIDEMIA LDL GOAL <130: ICD-10-CM

## 2019-04-22 DIAGNOSIS — Z11.51 SCREENING FOR HUMAN PAPILLOMAVIRUS: ICD-10-CM

## 2019-04-22 LAB
ALBUMIN SERPL-MCNC: 4.5 G/DL (ref 3.4–5)
ALP SERPL-CCNC: 106 U/L (ref 40–150)
ALT SERPL W P-5'-P-CCNC: 24 U/L (ref 0–50)
ANION GAP SERPL CALCULATED.3IONS-SCNC: 3 MMOL/L (ref 3–14)
AST SERPL W P-5'-P-CCNC: 21 U/L (ref 0–45)
BILIRUB SERPL-MCNC: 0.6 MG/DL (ref 0.2–1.3)
BUN SERPL-MCNC: 10 MG/DL (ref 7–30)
CALCIUM SERPL-MCNC: 9.2 MG/DL (ref 8.5–10.1)
CHLORIDE SERPL-SCNC: 105 MMOL/L (ref 94–109)
CO2 SERPL-SCNC: 30 MMOL/L (ref 20–32)
CREAT SERPL-MCNC: 0.69 MG/DL (ref 0.52–1.04)
GFR SERPL CREATININE-BSD FRML MDRD: >90 ML/MIN/{1.73_M2}
GLUCOSE SERPL-MCNC: 86 MG/DL (ref 70–99)
POTASSIUM SERPL-SCNC: 3.6 MMOL/L (ref 3.4–5.3)
PROT SERPL-MCNC: 7.7 G/DL (ref 6.8–8.8)
SODIUM SERPL-SCNC: 138 MMOL/L (ref 133–144)

## 2019-04-22 PROCEDURE — 36415 COLL VENOUS BLD VENIPUNCTURE: CPT | Performed by: NURSE PRACTITIONER

## 2019-04-22 PROCEDURE — G0145 SCR C/V CYTO,THINLAYER,RESCR: HCPCS | Performed by: NURSE PRACTITIONER

## 2019-04-22 PROCEDURE — 87624 HPV HI-RISK TYP POOLED RSLT: CPT | Performed by: NURSE PRACTITIONER

## 2019-04-22 PROCEDURE — 90715 TDAP VACCINE 7 YRS/> IM: CPT | Performed by: NURSE PRACTITIONER

## 2019-04-22 PROCEDURE — 80053 COMPREHEN METABOLIC PANEL: CPT | Performed by: NURSE PRACTITIONER

## 2019-04-22 PROCEDURE — 99396 PREV VISIT EST AGE 40-64: CPT | Mod: 25 | Performed by: NURSE PRACTITIONER

## 2019-04-22 PROCEDURE — 90471 IMMUNIZATION ADMIN: CPT | Performed by: NURSE PRACTITIONER

## 2019-04-22 RX ORDER — NICOTINE 14MG/24HR
PATCH, TRANSDERMAL 24 HOURS TRANSDERMAL
COMMUNITY

## 2019-04-22 RX ORDER — OMEGA-3 FATTY ACIDS/FISH OIL 300-1000MG
CAPSULE ORAL
COMMUNITY

## 2019-04-22 ASSESSMENT — MIFFLIN-ST. JEOR: SCORE: 1351.78

## 2019-04-22 NOTE — PATIENT INSTRUCTIONS

## 2019-04-22 NOTE — LETTER
April 30, 2019    Parisa Wong  [address] [address]    Dear ,  This letter is regarding your recent Pap smear (cervical cancer screening) and Human Papillomavirus (HPV) test.  We are happy to inform you that your Pap smear result is normal. Cervical cancer is closely linked with certain types of HPV. Your results showed no evidence of high-risk HPV.  Therefore we recommend you return in 3 years for your next pap smear.  You will still need to return to the clinic every year for an annual exam and other preventive tests.  If you have additional questions regarding this result, please call our registered nurse, Candy at [phone].  Sincerely,    ANGE CARPENTER NP, APRN CNP/rlm

## 2019-04-22 NOTE — PROGRESS NOTES
SUBJECTIVE:   CC: Parisa Wong is an 54 year old woman who presents for preventive health visit.     Healthy Habits:    Do you get at least three servings of calcium containing foods daily (dairy, green leafy vegetables, etc.)? yes    Amount of exercise or daily activities, outside of work: 2 day(s) per week    Problems taking medications regularly No    Medication side effects: No    Have you had an eye exam in the past two years? yes    Do you see a dentist twice per year? yes    Do you have sleep apnea, excessive snoring or daytime drowsiness?no          Today's PHQ-2 Score:   PHQ-2 ( 1999 Pfizer) 11/20/2018 5/16/2018   Q1: Little interest or pleasure in doing things 0 0   Q2: Feeling down, depressed or hopeless 0 0   PHQ-2 Score 0 0       Abuse: Current or Past(Physical, Sexual or Emotional)- No  Do you feel safe in your environment? Yes    Social History     Tobacco Use     Smoking status: Former Smoker     Types: Cigars     Smokeless tobacco: Never Used     Tobacco comment: son and huband smoke in the house   Substance Use Topics     Alcohol use: Yes     Alcohol/week: 2.4 - 3.0 oz     Types: 4 - 5 Cans of beer per week     Comment: none since november     If you drink alcohol do you typically have >3 drinks per day or >7 drinks per week? No                     Reviewed orders with patient.  Reviewed health maintenance and updated orders accordingly - Yes  BP Readings from Last 3 Encounters:   04/22/19 112/60   11/20/18 158/83   05/16/18 126/85    Wt Readings from Last 3 Encounters:   04/22/19 78.5 kg (173 lb)   11/20/18 73.6 kg (162 lb 3.2 oz)   05/16/18 71.2 kg (157 lb)                  Patient Active Problem List   Diagnosis     GOITER UNINODULAR, NONTOXIC     ENLARGED UTERUS     CARDIOVASCULAR SCREENING; LDL GOAL LESS THAN 160     24 hour contact handout given     Memory difficulty     Elevated liver enzymes     Vitamin D deficiency     Colon polyps     Obese     Knee pain     SI (sacroiliac) joint  inflammation (H)     Lumbar spinal stenosis     Thumb pain     Chronic hepatitis C without hepatic coma (H)     Degenerative arthritis of thumb     Past Surgical History:   Procedure Laterality Date     COLONOSCOPY  1/29/2014    Procedure: COMBINED COLONOSCOPY, SINGLE BIOPSY/POLYPECTOMY BY BIOPSY;  Gastroscopy;  Surgeon: Prashanth Lin MD;  Location: [facility]     D & C  6-     HC ABLATION, ENDOMETRIAL, THERMAL, W/O HYSTEROSCOPIC GUIDANCE  6-    novasure     SURGICAL HISTORY OF -   1979    tonsillectomy and adenoidectomy     SURGICAL HISTORY OF -       discectomy L5, S1       Social History     Tobacco Use     Smoking status: Former Smoker     Types: Cigars     Smokeless tobacco: Never Used     Tobacco comment: son and huband smoke in the house   Substance Use Topics     Alcohol use: Yes     Alcohol/week: 2.4 - 3.0 oz     Types: 4 - 5 Cans of beer per week     Comment: none since november     Family History   Problem Relation Age of Onset     Cancer Maternal Grandmother         throat     Alcohol/Drug Maternal Grandfather         alcohol     Neurologic Disorder Daughter         cerebral palsy     Gastrointestinal Disease Mother         Divertic     Gynecology Mother         Hysterectomy     Cancer Mother         Leukemia     Breast Cancer Maternal Aunt      Unknown/Adopted Father      Cancer - colorectal No family hx of      Prostate Cancer No family hx of      C.A.D. No family hx of          Current Outpatient Medications   Medication Sig Dispense Refill     medical cannabis (Patient's own supply.  Not a prescription) 1 capsule daily (This is NOT a prescription, and does not certify that the patient has a qualifying medical condition for medical cannabis.  The purpose of this order is  to document that the patient reports taking medical cannabis.)       methyl salicylate liquid Apply topically as needed for moderate to severe pain (Max Freeze)        omega 3 1000 MG CAPS        PREBIOTIC PRODUCT  PO        Saccharomyces boulardii (PROBIOTIC) 250 MG CAPS        UNABLE TO FIND MEDICATION NAME: Protandim       UNABLE TO FIND MEDICATION NAME: Diatomaceous       UNABLE TO FIND MEDICATION NAME: Iodine, 3 drops on tongue once per day       Allergies   Allergen Reactions     Codeine      Sulfa Drugs        Mammogram Screening: Patient over age 50, mutual decision to screen reflected in health maintenance.    Pertinent mammograms are reviewed under the imaging tab.  History of abnormal Pap smear: NO - age 30- 65 PAP every 3 years recommended  PAP / HPV Latest Ref Rng & Units 3/24/2015 4/4/2012 11/11/2009   PAP - NIL NIL NIL   HPV 16 DNA NEG Negative - -   HPV 18 DNA NEG Negative - -   OTHER HR HPV NEG Negative - -     Reviewed and updated as needed this visit by clinical staff  Tobacco  Allergies  Meds         Reviewed and updated as needed this visit by Provider        No health care concerns.      ROS:  CONSTITUTIONAL: NEGATIVE for fever, chills, change in weight  INTEGUMENTARY/SKIN: NEGATIVE for worrisome rashes, moles or lesions  EYES: NEGATIVE for vision changes or irritation and current with eye exam    ENT: NEGATIVE for ear, mouth and throat problems and current with dentist    RESP: NEGATIVE for significant cough or SOB  BREAST: NEGATIVE for masses, tenderness or discharge  CV: NEGATIVE for chest pain, palpitations or peripheral edema  GI: NEGATIVE for nausea, abdominal pain, heartburn, or change in bowel habits  : NEGATIVE for unusual urinary or vaginal symptoms. No vaginal bleeding.  MUSCULOSKELETAL:NEGATIVE for significant arthralgias or myalgia and POSITIVE  for back pain left lower back pain    NEURO: NEGATIVE for weakness, dizziness or paresthesias  ENDOCRINE: NEGATIVE for temperature intolerance, skin/hair changes  HEME/ALLERGY/IMMUNE: NEGATIVE for bleeding problems  PSYCHIATRIC: NEGATIVE for changes in mood or affect     OBJECTIVE:   /60   Pulse 60   Temp 97.3  F (36.3  C)   Resp 18    "Ht 1.613 m (5' 3.5\")   Wt 78.5 kg (173 lb)   SpO2 99%   BMI 30.16 kg/m    EXAM:  GENERAL APPEARANCE: healthy, alert and no distress  EYES: Eyes grossly normal to inspection, PERRL and conjunctivae and sclerae normal  HENT: ear canals and TM's normal, nose and mouth without ulcers or lesions, oropharynx clear and oral mucous membranes moist  NECK: no adenopathy, no asymmetry, masses, or scars and thyroid normal to palpation  RESP: lungs clear to auscultation - no rales, rhonchi or wheezes  BREAST: normal without masses, tenderness or nipple discharge and no palpable axillary masses or adenopathy  CV: regular rate and rhythm, normal S1 S2, no S3 or S4, no murmur, click or rub, no peripheral edema and peripheral pulses strong  ABDOMEN: soft, nontender, no hepatosplenomegaly, no masses and bowel sounds normal   (female): normal female external genitalia, normal urethral meatus, vaginal mucosal atrophy noted, normal cervix, adnexae, and uterus without masses or abnormal discharge  MS: no musculoskeletal defects are noted and gait is age appropriate without ataxia  SKIN: no suspicious lesions or rashes  NEURO: Normal strength and tone, sensory exam grossly normal, mentation intact and speech normal  PSYCH: mentation appears normal and affect normal/bright    Diagnostic Test Results:  pending    ASSESSMENT/PLAN:     ASSESSMENT/PLAN:      ICD-10-CM    1. Routine history and physical examination of adult Z00.00    2. Special screening for malignant neoplasms, colon Z12.11 Fecal colorectal cancer screen (FIT)     CANCELED: GASTROENTEROLOGY ADULT REF PROCEDURE ONLY [facility] [phone]; No Provider Preference   3. Hyperlipidemia LDL goal <130 E78.5 Lipid panel reflex to direct LDL Fasting   4. Screening for human papillomavirus Z11.51 Pap imaged thin layer screen with HPV - recommended age 30 - 65     HPV High Risk Types DNA Cervical   5. Chronic hepatitis C without hepatic coma (H) B18.2    6. History of colonic " polyps Z86.010    7. Vitamin D deficiency E55.9    8. Visit for screening mammogram Z12.31 *MA Screening Digital Bilateral   9. Need for vaccination Z23 TDAP VACCINE (ADACEL) [66235.002]     1st  Administration  [[id number]]       Patient Instructions     Preventive Health Recommendations  Female Ages 50 - 64    Yearly exam: See your health care provider every year in order to  o Review health changes.   o Discuss preventive care.    o Review your medicines if your doctor has prescribed any.      Get a Pap test every three years (unless you have an abnormal result and your provider advises testing more often).    If you get Pap tests with HPV test, you only need to test every 5 years, unless you have an abnormal result.     You do not need a Pap test if your uterus was removed (hysterectomy) and you have not had cancer.    You should be tested each year for STDs (sexually transmitted diseases) if you're at risk.     Have a mammogram every 1 to 2 years.    Have a colonoscopy at age 50, or have a yearly FIT test (stool test). These exams screen for colon cancer.      Have a cholesterol test every 5 years, or more often if advised.    Have a diabetes test (fasting glucose) every three years. If you are at risk for diabetes, you should have this test more often.     If you are at risk for osteoporosis (brittle bone disease), think about having a bone density scan (DEXA).    Shots: Get a flu shot each year. Get a tetanus shot every 10 years.    Nutrition:     Eat at least 5 servings of fruits and vegetables each day.    Eat whole-grain bread, whole-wheat pasta and brown rice instead of white grains and rice.    Get adequate Calcium and Vitamin D.     Lifestyle    Exercise at least 150 minutes a week (30 minutes a day, 5 days a week). This will help you control your weight and prevent disease.    Limit alcohol to one drink per day.    No smoking.     Wear sunscreen to prevent skin cancer.     See your dentist every six months  "for an exam and cleaning.    See your eye doctor every 1 to 2 years.      You did get your tetanus immunization today     Keep exercises.      Do the FIT test     Get your mammogram done       You are looking great.      Congratulations on your nursing home !!!!   ENJOY                                     COUNSELING:   Reviewed preventive health counseling, as reflected in patient instructions       Regular exercise       Healthy diet/nutrition       Vision screening       Immunizations    Vaccinated for: TDAP             Colon cancer screening    BP Readings from Last 1 Encounters:   04/22/19 112/60     Estimated body mass index is 30.16 kg/m  as calculated from the following:    Height as of this encounter: 1.613 m (5' 3.5\").    Weight as of this encounter: 78.5 kg (173 lb).      Weight management plan: Discussed healthy diet and exercise guidelines     reports that she has quit smoking. Her smoking use included cigars. She has never used smokeless tobacco.      Counseling Resources:  ATP IV Guidelines  Pooled Cohorts Equation Calculator  Breast Cancer Risk Calculator  FRAX Risk Assessment  ICSI Preventive Guidelines  Dietary Guidelines for Americans, 2010  USDA's MyPlate  ASA Prophylaxis  Lung CA Screening    ANGE CARPENTER NP, APRN CNP  [facility]  "

## 2019-04-26 LAB
COPATH REPORT: NORMAL
PAP: NORMAL

## 2019-04-29 LAB
FINAL DIAGNOSIS: NORMAL
HPV HR 12 DNA CVX QL NAA+PROBE: NEGATIVE
HPV16 DNA SPEC QL NAA+PROBE: NEGATIVE
HPV18 DNA SPEC QL NAA+PROBE: NEGATIVE
SPECIMEN DESCRIPTION: NORMAL
SPECIMEN SOURCE CVX/VAG CYTO: NORMAL

## 2019-05-21 ENCOUNTER — OFFICE VISIT (OUTPATIENT)
Dept: GASTROENTEROLOGY | Facility: CLINIC | Age: 54
End: 2019-05-21
Attending: INTERNAL MEDICINE
Payer: COMMERCIAL

## 2019-05-21 ENCOUNTER — ANCILLARY PROCEDURE (OUTPATIENT)
Dept: ULTRASOUND IMAGING | Facility: CLINIC | Age: 54
End: 2019-05-21
Attending: INTERNAL MEDICINE
Payer: COMMERCIAL

## 2019-05-21 VITALS
RESPIRATION RATE: 18 BRPM | BODY MASS INDEX: 30.22 KG/M2 | WEIGHT: 177 LBS | HEIGHT: 64 IN | SYSTOLIC BLOOD PRESSURE: 129 MMHG | DIASTOLIC BLOOD PRESSURE: 87 MMHG | HEART RATE: 62 BPM | TEMPERATURE: 98.5 F | OXYGEN SATURATION: 95 %

## 2019-05-21 DIAGNOSIS — K74.60 CIRRHOSIS OF LIVER WITHOUT ASCITES, UNSPECIFIED HEPATIC CIRRHOSIS TYPE (H): ICD-10-CM

## 2019-05-21 DIAGNOSIS — E78.5 HYPERLIPIDEMIA LDL GOAL <130: ICD-10-CM

## 2019-05-21 DIAGNOSIS — K74.60 CIRRHOSIS OF LIVER WITHOUT ASCITES, UNSPECIFIED HEPATIC CIRRHOSIS TYPE (H): Primary | ICD-10-CM

## 2019-05-21 LAB
AFP SERPL-MCNC: 6 UG/L (ref 0–8)
ALBUMIN SERPL-MCNC: 4.3 G/DL (ref 3.4–5)
ALP SERPL-CCNC: 99 U/L (ref 40–150)
ALT SERPL W P-5'-P-CCNC: 25 U/L (ref 0–50)
ANION GAP SERPL CALCULATED.3IONS-SCNC: 5 MMOL/L (ref 3–14)
AST SERPL W P-5'-P-CCNC: 25 U/L (ref 0–45)
BILIRUB DIRECT SERPL-MCNC: 0.2 MG/DL (ref 0–0.2)
BILIRUB SERPL-MCNC: 0.7 MG/DL (ref 0.2–1.3)
BUN SERPL-MCNC: 16 MG/DL (ref 7–30)
CALCIUM SERPL-MCNC: 9.2 MG/DL (ref 8.5–10.1)
CHLORIDE SERPL-SCNC: 107 MMOL/L (ref 94–109)
CHOLEST SERPL-MCNC: 183 MG/DL
CO2 SERPL-SCNC: 27 MMOL/L (ref 20–32)
CREAT SERPL-MCNC: 0.83 MG/DL (ref 0.52–1.04)
ERYTHROCYTE [DISTWIDTH] IN BLOOD BY AUTOMATED COUNT: 12.4 % (ref 10–15)
GFR SERPL CREATININE-BSD FRML MDRD: 79 ML/MIN/{1.73_M2}
GLUCOSE SERPL-MCNC: 92 MG/DL (ref 70–99)
HCT VFR BLD AUTO: 40.6 % (ref 35–47)
HDLC SERPL-MCNC: 87 MG/DL
HGB BLD-MCNC: 13.3 G/DL (ref 11.7–15.7)
INR PPP: 1.03 (ref 0.86–1.14)
LDLC SERPL CALC-MCNC: 83 MG/DL
MCH RBC QN AUTO: 32.6 PG (ref 26.5–33)
MCHC RBC AUTO-ENTMCNC: 32.8 G/DL (ref 31.5–36.5)
MCV RBC AUTO: 100 FL (ref 78–100)
NONHDLC SERPL-MCNC: 96 MG/DL
PLATELET # BLD AUTO: 98 10E9/L (ref 150–450)
POTASSIUM SERPL-SCNC: 3.6 MMOL/L (ref 3.4–5.3)
PROT SERPL-MCNC: 7.3 G/DL (ref 6.8–8.8)
RBC # BLD AUTO: 4.08 10E12/L (ref 3.8–5.2)
SODIUM SERPL-SCNC: 139 MMOL/L (ref 133–144)
TRIGL SERPL-MCNC: 64 MG/DL
WBC # BLD AUTO: 5.7 10E9/L (ref 4–11)

## 2019-05-21 PROCEDURE — 36415 COLL VENOUS BLD VENIPUNCTURE: CPT | Performed by: INTERNAL MEDICINE

## 2019-05-21 PROCEDURE — 82105 ALPHA-FETOPROTEIN SERUM: CPT | Performed by: INTERNAL MEDICINE

## 2019-05-21 PROCEDURE — G0463 HOSPITAL OUTPT CLINIC VISIT: HCPCS | Mod: ZF

## 2019-05-21 PROCEDURE — 85610 PROTHROMBIN TIME: CPT | Performed by: INTERNAL MEDICINE

## 2019-05-21 PROCEDURE — 80048 BASIC METABOLIC PNL TOTAL CA: CPT | Performed by: INTERNAL MEDICINE

## 2019-05-21 PROCEDURE — 80061 LIPID PANEL: CPT | Performed by: INTERNAL MEDICINE

## 2019-05-21 PROCEDURE — 80076 HEPATIC FUNCTION PANEL: CPT | Performed by: INTERNAL MEDICINE

## 2019-05-21 PROCEDURE — 85027 COMPLETE CBC AUTOMATED: CPT | Performed by: INTERNAL MEDICINE

## 2019-05-21 ASSESSMENT — PAIN SCALES - GENERAL: PAINLEVEL: NO PAIN (0)

## 2019-05-21 ASSESSMENT — MIFFLIN-ST. JEOR: SCORE: 1369.93

## 2019-05-21 NOTE — LETTER
"5/21/2019     RE: Parisa Wong  [address] [facility] [id number]     Dear Colleague,    Thank you for referring your patient, Parisa Wong, to the [facility] HEPATOLOGY at [facility]. Please see a copy of my visit note below.    I had the pleasure of seeing Parisa Wong for followup in the Liver Clinic at the [facility] on 05/21/2019.  Ms. 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  She is a sustained virologic responder to hepatitis C treatment.        She is doing well.  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  With that, she has gained about 15 pounds in weight and admits that she needs to eat healthier.      She denies any abdominal pain, itching or skin rash or fatigue.  She denies any increased abdominal girth or lower extremity edema.  She denies any fevers or chills, cough or shortness of breath.  She denies any nausea, vomiting, diarrhea or constipation.  Her appetite has been good as I mentioned, and her weight is up 15 pounds.  There otherwise have been no other new events since she was last seen.     Current Outpatient Medications   Medication     medical cannabis (Patient's own supply.  Not a prescription)     methyl salicylate liquid     omega 3 1000 MG CAPS     PREBIOTIC PRODUCT PO     Saccharomyces boulardii (PROBIOTIC) 250 MG CAPS     UNABLE TO FIND     UNABLE TO FIND     UNABLE TO FIND     No current facility-administered medications for this visit.      /87 (BP Location: Right arm, Patient Position: Sitting, Cuff Size: Adult Regular)   Pulse 62   Temp 98.5  F (36.9  C) (Oral)   Resp 18   Ht 1.613 m (5' 3.5\")   Wt 80.3 kg (177 lb)   SpO2 95%   BMI 30.86 kg/m       In general she looks quite well.  HEENT exam shows no scleral icterus or temporal muscle wasting.  Her chest is clear.  Her abdominal exam shows no increase in girth.  No masses " or tenderness to palpation are present.  Her liver is 10 cm in span without left lobe enlargement.  No spleen tip is palpable, and extremity exam shows no edema.  Skin exam shows no stigmata of chronic liver disease.  Neurologic exam shows no asterixis.     Recent Results (from the past 168 hour(s))   INR [EBD1053]    Collection Time: 05/21/19  7:21 AM   Result Value Ref Range    INR 1.03 0.86 - 1.14   CBC with platelets [[id number]]    Collection Time: 05/21/19  7:21 AM   Result Value Ref Range    WBC 5.7 4.0 - 11.0 10e9/L    RBC Count 4.08 3.8 - 5.2 10e12/L    Hemoglobin 13.3 11.7 - 15.7 g/dL    Hematocrit 40.6 35.0 - 47.0 %     78 - 100 fl    MCH 32.6 26.5 - 33.0 pg    MCHC 32.8 31.5 - 36.5 g/dL    RDW 12.4 10.0 - 15.0 %    Platelet Count 98 (L) 150 - 450 10e9/L   Basic metabolic panel [LAB15]    Collection Time: 05/21/19  7:21 AM   Result Value Ref Range    Sodium 139 133 - 144 mmol/L    Potassium 3.6 3.4 - 5.3 mmol/L    Chloride 107 94 - 109 mmol/L    Carbon Dioxide 27 20 - 32 mmol/L    Anion Gap 5 3 - 14 mmol/L    Glucose 92 70 - 99 mg/dL    Urea Nitrogen 16 7 - 30 mg/dL    Creatinine 0.83 0.52 - 1.04 mg/dL    GFR Estimate 79 >60 mL/min/[1.73_m2]    GFR Estimate If Black >90 >60 mL/min/[1.73_m2]    Calcium 9.2 8.5 - 10.1 mg/dL   Hepatic Panel [LAB20]    Collection Time: 05/21/19  7:21 AM   Result Value Ref Range    Bilirubin Direct 0.2 0.0 - 0.2 mg/dL    Bilirubin Total 0.7 0.2 - 1.3 mg/dL    Albumin 4.3 3.4 - 5.0 g/dL    Protein Total 7.3 6.8 - 8.8 g/dL    Alkaline Phosphatase 99 40 - 150 U/L    ALT 25 0 - 50 U/L    AST 25 0 - 45 U/L   Lipid panel reflex to direct LDL Fasting    Collection Time: 05/21/19  7:21 AM   Result Value Ref Range    Cholesterol 183 <200 mg/dL    Triglycerides 64 <150 mg/dL    HDL Cholesterol 87 >49 mg/dL    LDL Cholesterol Calculated 83 <100 mg/dL    Non HDL Cholesterol 96 <130 mg/dL      Her ultrasound shows a cirrhotic configuration but no mass lesions are seen.  There is no  ascites.      IMPRESSION:  Ms. Wong has cirrhosis caused by chronic hepatitis C for which she is a sustained virologic responder.  Her disease is very well compensated at this point in time.  Her last fibrosis scan suggested that she only has stage III fibrosis and we had opted not to go forward with a screening endoscopies going forward.  She is due for a colonoscopy and we did have a discussion about that.  I did point out that while she could send in stool studies, but once she has had polyps like she had previously she probably should have a followup colonoscopy.  She probably will go forward with that.      She is also due for vaccinations, particularly pneumococcal vaccinations, which she is going to think about.  She is using an herbal preparation call Protandim, which she believes could take the place of her vaccinations, although I did point out that probably the mechanism used in those with cirrhosis are different in that the Protandim is primarily an antioxidant.  She also is due for a bone density study, which she will have done locally as well.  I will see her back in the clinic in 6 months for repeat ultrasound and blood work.      Thank you very much for allowing me to participate in the care of this patient.  If you have any questions regarding my recommendations, please do not hesitate to contact me.       David So MD      Professor of Medicine  [employer] [facility] [employer]      Executive Medical Director, Solid Organ Transplant Program  [facility]

## 2019-05-21 NOTE — LETTER
"5/21/2019      RE: Parisa Wong  [address] [facility] [address]       I had the pleasure of seeing Parisa Wong for followup in the Liver Clinic at the [facility] on 05/21/2019.  Ms. 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  She is a sustained virologic responder to hepatitis C treatment.        She is doing well.  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  With that, she has gained about 15 pounds in weight and admits that she needs to eat healthier.      She denies any abdominal pain, itching or skin rash or fatigue.  She denies any increased abdominal girth or lower extremity edema.  She denies any fevers or chills, cough or shortness of breath.  She denies any nausea, vomiting, diarrhea or constipation.  Her appetite has been good as I mentioned, and her weight is up 15 pounds.  There otherwise have been no other new events since she was last seen.     Current Outpatient Medications   Medication     medical cannabis (Patient's own supply.  Not a prescription)     methyl salicylate liquid     omega 3 1000 MG CAPS     PREBIOTIC PRODUCT PO     Saccharomyces boulardii (PROBIOTIC) 250 MG CAPS     UNABLE TO FIND     UNABLE TO FIND     UNABLE TO FIND     No current facility-administered medications for this visit.      /87 (BP Location: Right arm, Patient Position: Sitting, Cuff Size: Adult Regular)   Pulse 62   Temp 98.5  F (36.9  C) (Oral)   Resp 18   Ht 1.613 m (5' 3.5\")   Wt 80.3 kg (177 lb)   SpO2 95%   BMI 30.86 kg/m       In general she looks quite well.  HEENT exam shows no scleral icterus or temporal muscle wasting.  Her chest is clear.  Her abdominal exam shows no increase in girth.  No masses or tenderness to palpation are present.  Her liver is 10 cm in span without left lobe enlargement.  No spleen tip is palpable, and extremity exam shows no edema.  Skin exam shows no stigmata of " chronic liver disease.  Neurologic exam shows no asterixis.     Recent Results (from the past 168 hour(s))   INR [[id number]]    Collection Time: 05/21/19  7:21 AM   Result Value Ref Range    INR 1.03 0.86 - 1.14   CBC with platelets [[id number]]    Collection Time: 05/21/19  7:21 AM   Result Value Ref Range    WBC 5.7 4.0 - 11.0 10e9/L    RBC Count 4.08 3.8 - 5.2 10e12/L    Hemoglobin 13.3 11.7 - 15.7 g/dL    Hematocrit 40.6 35.0 - 47.0 %     78 - 100 fl    MCH 32.6 26.5 - 33.0 pg    MCHC 32.8 31.5 - 36.5 g/dL    RDW 12.4 10.0 - 15.0 %    Platelet Count 98 (L) 150 - 450 10e9/L   Basic metabolic panel [LAB15]    Collection Time: 05/21/19  7:21 AM   Result Value Ref Range    Sodium 139 133 - 144 mmol/L    Potassium 3.6 3.4 - 5.3 mmol/L    Chloride 107 94 - 109 mmol/L    Carbon Dioxide 27 20 - 32 mmol/L    Anion Gap 5 3 - 14 mmol/L    Glucose 92 70 - 99 mg/dL    Urea Nitrogen 16 7 - 30 mg/dL    Creatinine 0.83 0.52 - 1.04 mg/dL    GFR Estimate 79 >60 mL/min/[1.73_m2]    GFR Estimate If Black >90 >60 mL/min/[1.73_m2]    Calcium 9.2 8.5 - 10.1 mg/dL   Hepatic Panel [LAB20]    Collection Time: 05/21/19  7:21 AM   Result Value Ref Range    Bilirubin Direct 0.2 0.0 - 0.2 mg/dL    Bilirubin Total 0.7 0.2 - 1.3 mg/dL    Albumin 4.3 3.4 - 5.0 g/dL    Protein Total 7.3 6.8 - 8.8 g/dL    Alkaline Phosphatase 99 40 - 150 U/L    ALT 25 0 - 50 U/L    AST 25 0 - 45 U/L   Lipid panel reflex to direct LDL Fasting    Collection Time: 05/21/19  7:21 AM   Result Value Ref Range    Cholesterol 183 <200 mg/dL    Triglycerides 64 <150 mg/dL    HDL Cholesterol 87 >49 mg/dL    LDL Cholesterol Calculated 83 <100 mg/dL    Non HDL Cholesterol 96 <130 mg/dL      Her ultrasound shows a cirrhotic configuration but no mass lesions are seen.  There is no ascites.      IMPRESSION:  Ms. Wong has cirrhosis caused by chronic hepatitis C for which she is a sustained virologic responder.  Her disease is very well compensated at this point in time.   Her last fibrosis scan suggested that she only has stage III fibrosis and we had opted not to go forward with a screening endoscopies going forward.  She is due for a colonoscopy and we did have a discussion about that.  I did point out that while she could send in stool studies, but once she has had polyps like she had previously she probably should have a followup colonoscopy.  She probably will go forward with that.      She is also due for vaccinations, particularly pneumococcal vaccinations, which she is going to think about.  She is using an herbal preparation call Protandim, which she believes could take the place of her vaccinations, although I did point out that probably the mechanism used in those with cirrhosis are different in that the Protandim is primarily an antioxidant.  She also is due for a bone density study, which she will have done locally as well.  I will see her back in the clinic in 6 months for repeat ultrasound and blood work.      Thank you very much for allowing me to participate in the care of this patient.  If you have any questions regarding my recommendations, please do not hesitate to contact me.       David So MD      Professor of Medicine  [facility] [employer]      Executive Medical Director, Solid Organ Transplant Program  [facility]        David So MD

## 2019-05-21 NOTE — PROGRESS NOTES
"I had the pleasure of seeing Parisa Wong for followup in the Liver Clinic at the [facility] on 05/21/2019.  Ms. 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  She is a sustained virologic responder to hepatitis C treatment.        She is doing well.  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  With that, she has gained about 15 pounds in weight and admits that she needs to eat healthier.      She denies any abdominal pain, itching or skin rash or fatigue.  She denies any increased abdominal girth or lower extremity edema.  She denies any fevers or chills, cough or shortness of breath.  She denies any nausea, vomiting, diarrhea or constipation.  Her appetite has been good as I mentioned, and her weight is up 15 pounds.  There otherwise have been no other new events since she was last seen.     Current Outpatient Medications   Medication     medical cannabis (Patient's own supply.  Not a prescription)     methyl salicylate liquid     omega 3 1000 MG CAPS     PREBIOTIC PRODUCT PO     Saccharomyces boulardii (PROBIOTIC) 250 MG CAPS     UNABLE TO FIND     UNABLE TO FIND     UNABLE TO FIND     No current facility-administered medications for this visit.      /87 (BP Location: Right arm, Patient Position: Sitting, Cuff Size: Adult Regular)   Pulse 62   Temp 98.5  F (36.9  C) (Oral)   Resp 18   Ht 1.613 m (5' 3.5\")   Wt 80.3 kg (177 lb)   SpO2 95%   BMI 30.86 kg/m      In general she looks quite well.  HEENT exam shows no scleral icterus or temporal muscle wasting.  Her chest is clear.  Her abdominal exam shows no increase in girth.  No masses or tenderness to palpation are present.  Her liver is 10 cm in span without left lobe enlargement.  No spleen tip is palpable, and extremity exam shows no edema.  Skin exam shows no stigmata of chronic liver disease.  Neurologic exam shows no asterixis.     Recent Results (from " the past 168 hour(s))   INR [[id number]]    Collection Time: 05/21/19  7:21 AM   Result Value Ref Range    INR 1.03 0.86 - 1.14   CBC with platelets [[id number]]    Collection Time: 05/21/19  7:21 AM   Result Value Ref Range    WBC 5.7 4.0 - 11.0 10e9/L    RBC Count 4.08 3.8 - 5.2 10e12/L    Hemoglobin 13.3 11.7 - 15.7 g/dL    Hematocrit 40.6 35.0 - 47.0 %     78 - 100 fl    MCH 32.6 26.5 - 33.0 pg    MCHC 32.8 31.5 - 36.5 g/dL    RDW 12.4 10.0 - 15.0 %    Platelet Count 98 (L) 150 - 450 10e9/L   Basic metabolic panel [LAB15]    Collection Time: 05/21/19  7:21 AM   Result Value Ref Range    Sodium 139 133 - 144 mmol/L    Potassium 3.6 3.4 - 5.3 mmol/L    Chloride 107 94 - 109 mmol/L    Carbon Dioxide 27 20 - 32 mmol/L    Anion Gap 5 3 - 14 mmol/L    Glucose 92 70 - 99 mg/dL    Urea Nitrogen 16 7 - 30 mg/dL    Creatinine 0.83 0.52 - 1.04 mg/dL    GFR Estimate 79 >60 mL/min/[1.73_m2]    GFR Estimate If Black >90 >60 mL/min/[1.73_m2]    Calcium 9.2 8.5 - 10.1 mg/dL   Hepatic Panel [LAB20]    Collection Time: 05/21/19  7:21 AM   Result Value Ref Range    Bilirubin Direct 0.2 0.0 - 0.2 mg/dL    Bilirubin Total 0.7 0.2 - 1.3 mg/dL    Albumin 4.3 3.4 - 5.0 g/dL    Protein Total 7.3 6.8 - 8.8 g/dL    Alkaline Phosphatase 99 40 - 150 U/L    ALT 25 0 - 50 U/L    AST 25 0 - 45 U/L   Lipid panel reflex to direct LDL Fasting    Collection Time: 05/21/19  7:21 AM   Result Value Ref Range    Cholesterol 183 <200 mg/dL    Triglycerides 64 <150 mg/dL    HDL Cholesterol 87 >49 mg/dL    LDL Cholesterol Calculated 83 <100 mg/dL    Non HDL Cholesterol 96 <130 mg/dL      Her ultrasound shows a cirrhotic configuration but no mass lesions are seen.  There is no ascites.      IMPRESSION:  Ms. Wong has cirrhosis caused by chronic hepatitis C for which she is a sustained virologic responder.  Her disease is very well compensated at this point in time.  Her last fibrosis scan suggested that she only has stage III fibrosis and we had  opted not to go forward with a screening endoscopies going forward.  She is due for a colonoscopy and we did have a discussion about that.  I did point out that while she could send in stool studies, but once she has had polyps like she had previously she probably should have a followup colonoscopy.  She probably will go forward with that.      She is also due for vaccinations, particularly pneumococcal vaccinations, which she is going to think about.  She is using an herbal preparation call Protandim, which she believes could take the place of her vaccinations, although I did point out that probably the mechanism used in those with cirrhosis are different in that the Protandim is primarily an antioxidant.  She also is due for a bone density study, which she will have done locally as well.  I will see her back in the clinic in 6 months for repeat ultrasound and blood work.      Thank you very much for allowing me to participate in the care of this patient.  If you have any questions regarding my recommendations, please do not hesitate to contact me.       David So MD      Professor of Medicine  [facility] [employer]      Executive Medical Director, Solid Organ Transplant Program  [facility]

## 2019-09-17 ENCOUNTER — TELEPHONE (OUTPATIENT)
Dept: FAMILY MEDICINE | Facility: CLINIC | Age: 54
End: 2019-09-17

## 2019-11-03 ENCOUNTER — HEALTH MAINTENANCE LETTER (OUTPATIENT)
Age: 54
End: 2019-11-03

## 2019-11-19 ENCOUNTER — OFFICE VISIT (OUTPATIENT)
Dept: GASTROENTEROLOGY | Facility: CLINIC | Age: 54
End: 2019-11-19
Attending: INTERNAL MEDICINE
Payer: COMMERCIAL

## 2019-11-19 ENCOUNTER — ANCILLARY PROCEDURE (OUTPATIENT)
Dept: ULTRASOUND IMAGING | Facility: CLINIC | Age: 54
End: 2019-11-19
Attending: INTERNAL MEDICINE
Payer: COMMERCIAL

## 2019-11-19 VITALS
OXYGEN SATURATION: 96 % | HEART RATE: 72 BPM | SYSTOLIC BLOOD PRESSURE: 112 MMHG | DIASTOLIC BLOOD PRESSURE: 76 MMHG | WEIGHT: 178 LBS | TEMPERATURE: 99 F | BODY MASS INDEX: 31.04 KG/M2

## 2019-11-19 DIAGNOSIS — K74.60 CIRRHOSIS OF LIVER WITHOUT ASCITES, UNSPECIFIED HEPATIC CIRRHOSIS TYPE (H): ICD-10-CM

## 2019-11-19 DIAGNOSIS — K74.60 CIRRHOSIS OF LIVER WITHOUT ASCITES, UNSPECIFIED HEPATIC CIRRHOSIS TYPE (H): Primary | ICD-10-CM

## 2019-11-19 LAB
AFP SERPL-MCNC: 7.8 UG/L (ref 0–8)
ALBUMIN SERPL-MCNC: 4.3 G/DL (ref 3.4–5)
ALP SERPL-CCNC: 107 U/L (ref 40–150)
ALT SERPL W P-5'-P-CCNC: 25 U/L (ref 0–50)
ANION GAP SERPL CALCULATED.3IONS-SCNC: 3 MMOL/L (ref 3–14)
AST SERPL W P-5'-P-CCNC: 16 U/L (ref 0–45)
BILIRUB DIRECT SERPL-MCNC: 0.2 MG/DL (ref 0–0.2)
BILIRUB SERPL-MCNC: 0.5 MG/DL (ref 0.2–1.3)
BUN SERPL-MCNC: 17 MG/DL (ref 7–30)
CALCIUM SERPL-MCNC: 9.3 MG/DL (ref 8.5–10.1)
CHLORIDE SERPL-SCNC: 107 MMOL/L (ref 94–109)
CO2 SERPL-SCNC: 29 MMOL/L (ref 20–32)
CREAT SERPL-MCNC: 0.82 MG/DL (ref 0.52–1.04)
ERYTHROCYTE [DISTWIDTH] IN BLOOD BY AUTOMATED COUNT: 12.4 % (ref 10–15)
GFR SERPL CREATININE-BSD FRML MDRD: 80 ML/MIN/{1.73_M2}
GLUCOSE SERPL-MCNC: 96 MG/DL (ref 70–99)
HCT VFR BLD AUTO: 41.7 % (ref 35–47)
HGB BLD-MCNC: 13.8 G/DL (ref 11.7–15.7)
INR PPP: 1 (ref 0.86–1.14)
MCH RBC QN AUTO: 33.2 PG (ref 26.5–33)
MCHC RBC AUTO-ENTMCNC: 33.1 G/DL (ref 31.5–36.5)
MCV RBC AUTO: 100 FL (ref 78–100)
PLATELET # BLD AUTO: 113 10E9/L (ref 150–450)
POTASSIUM SERPL-SCNC: 4.5 MMOL/L (ref 3.4–5.3)
PROT SERPL-MCNC: 7.6 G/DL (ref 6.8–8.8)
RBC # BLD AUTO: 4.16 10E12/L (ref 3.8–5.2)
SODIUM SERPL-SCNC: 139 MMOL/L (ref 133–144)
WBC # BLD AUTO: 5.6 10E9/L (ref 4–11)

## 2019-11-19 PROCEDURE — 82105 ALPHA-FETOPROTEIN SERUM: CPT | Performed by: INTERNAL MEDICINE

## 2019-11-19 PROCEDURE — G0463 HOSPITAL OUTPT CLINIC VISIT: HCPCS | Mod: ZF

## 2019-11-19 PROCEDURE — 80076 HEPATIC FUNCTION PANEL: CPT | Performed by: INTERNAL MEDICINE

## 2019-11-19 PROCEDURE — 80048 BASIC METABOLIC PNL TOTAL CA: CPT | Performed by: INTERNAL MEDICINE

## 2019-11-19 PROCEDURE — 85610 PROTHROMBIN TIME: CPT | Performed by: INTERNAL MEDICINE

## 2019-11-19 PROCEDURE — 36415 COLL VENOUS BLD VENIPUNCTURE: CPT | Performed by: INTERNAL MEDICINE

## 2019-11-19 PROCEDURE — 85027 COMPLETE CBC AUTOMATED: CPT | Performed by: INTERNAL MEDICINE

## 2019-11-19 ASSESSMENT — PAIN SCALES - GENERAL: PAINLEVEL: NO PAIN (0)

## 2019-11-19 NOTE — LETTER
11/19/2019      RE: Parisa Wong  [address] [facility] [address]       I had the pleasure of seeing Parisa Bullard for followup in the Liver Clinic at the [facility] on 11/19/2019.  Ms. 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  She is a sustained virologic responder to hepatitis C treatment.      She says she feels very well at this point in time.  She denies any abdominal pain, itching or skin rash or fatigue.  She denies any increased abdominal girth or lower extremity edema.  She denies any fevers or chills, cough or shortness of breath.  She denies any nausea or vomiting, diarrhea or constipation.  Her appetite has been good and her weight has remained the same.  There have been no other new events since she was last seen.     Current Outpatient Medications   Medication     medical cannabis (Patient's own supply.  Not a prescription)     methyl salicylate liquid     omega 3 1000 MG CAPS     PREBIOTIC PRODUCT PO     Saccharomyces boulardii (PROBIOTIC) 250 MG CAPS     UNABLE TO FIND     UNABLE TO FIND     UNABLE TO FIND     No current facility-administered medications for this visit.      /76 (BP Location: Right arm, Patient Position: Sitting, Cuff Size: Adult Regular)   Pulse 72   Temp 99  F (37.2  C)   Wt 80.7 kg (178 lb)   SpO2 96%   BMI 31.04 kg/m       EXAMINATION:  In general, she does look well.  HEENT exam shows no scleral icterus or temporal muscle wasting.  Her chest is clear.  Abdominal exam shows no increase in girth.  No masses or tenderness to palpation are present.  Her liver is 10 cm in span without left lobe enlargement.  No spleen tip is palpable and extremity exam shows no edema.  Skin exam shows no stigmata of chronic liver disease.  Neurologic exam shows no asterixis.     Recent Results (from the past 168 hour(s))   INR [[id number]]    Collection Time: 11/19/19  9:54 AM   Result Value Ref Range    INR 1.00  0.86 - 1.14   CBC with platelets [[id number]]    Collection Time: 11/19/19  9:54 AM   Result Value Ref Range    WBC 5.6 4.0 - 11.0 10e9/L    RBC Count 4.16 3.8 - 5.2 10e12/L    Hemoglobin 13.8 11.7 - 15.7 g/dL    Hematocrit 41.7 35.0 - 47.0 %     78 - 100 fl    MCH 33.2 (H) 26.5 - 33.0 pg    MCHC 33.1 31.5 - 36.5 g/dL    RDW 12.4 10.0 - 15.0 %    Platelet Count 113 (L) 150 - 450 10e9/L   Basic metabolic panel [LAB15]    Collection Time: 11/19/19  9:54 AM   Result Value Ref Range    Sodium 139 133 - 144 mmol/L    Potassium 4.5 3.4 - 5.3 mmol/L    Chloride 107 94 - 109 mmol/L    Carbon Dioxide 29 20 - 32 mmol/L    Anion Gap 3 3 - 14 mmol/L    Glucose 96 70 - 99 mg/dL    Urea Nitrogen 17 7 - 30 mg/dL    Creatinine 0.82 0.52 - 1.04 mg/dL    GFR Estimate 80 >60 mL/min/[1.73_m2]    GFR Estimate If Black >90 >60 mL/min/[1.73_m2]    Calcium 9.3 8.5 - 10.1 mg/dL   Hepatic Panel [LAB20]    Collection Time: 11/19/19  9:54 AM   Result Value Ref Range    Bilirubin Direct 0.2 0.0 - 0.2 mg/dL    Bilirubin Total 0.5 0.2 - 1.3 mg/dL    Albumin 4.3 3.4 - 5.0 g/dL    Protein Total 7.6 6.8 - 8.8 g/dL    Alkaline Phosphatase 107 40 - 150 U/L    ALT 25 0 - 50 U/L    AST 16 0 - 45 U/L      I did review her ultrasound which shows no mass lesions in the liver.  Her liver is mildly enlarged.  There is no evidence of ascites or splenomegaly.      IMPRESSION:  My impression is that Ms. Wong has cirrhosis caused by chronic hepatitis C.  She is now a sustained virologic responder to hepatitis C treatment and with that her liver tests are excellent.  We had previously made the decision not to do variceal screen and I will see her back in the clinic in 6 months for repeat blood work and chemistries.  She did turn down an offer for a flu shot.      Finally, she does need colorectal cancer screening which she is aware of and working with her local physician.      Thank you very much for allowing me to participate in the care of this  patient.  If you have any questions regarding my recommendations, please do not hesitate to contact me.       David So MD      Professor of Medicine  [facility] [employer]      Executive Medical Director, Solid Organ Transplant Program  [facility]

## 2019-11-19 NOTE — LETTER
11/19/2019       RE: Parisa Wong  [address]  [place] [address]     Dear Colleague,    Thank you for referring your patient, Parisa Wong, to the [facility] HEPATOLOGY at [facility]. Please see a copy of my visit note below.    I had the pleasure of seeing Parisa Bullard for followup in the Liver Clinic at the [facility] on 11/19/2019.  Ms. 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  She is a sustained virologic responder to hepatitis C treatment.      She says she feels very well at this point in time.  She denies any abdominal pain, itching or skin rash or fatigue.  She denies any increased abdominal girth or lower extremity edema.  She denies any fevers or chills, cough or shortness of breath.  She denies any nausea or vomiting, diarrhea or constipation.  Her appetite has been good and her weight has remained the same.  There have been no other new events since she was last seen.     Current Outpatient Medications   Medication     medical cannabis (Patient's own supply.  Not a prescription)     methyl salicylate liquid     omega 3 1000 MG CAPS     PREBIOTIC PRODUCT PO     Saccharomyces boulardii (PROBIOTIC) 250 MG CAPS     UNABLE TO FIND     UNABLE TO FIND     UNABLE TO FIND     No current facility-administered medications for this visit.      /76 (BP Location: Right arm, Patient Position: Sitting, Cuff Size: Adult Regular)   Pulse 72   Temp 99  F (37.2  C)   Wt 80.7 kg (178 lb)   SpO2 96%   BMI 31.04 kg/m       EXAMINATION:  In general, she does look well.  HEENT exam shows no scleral icterus or temporal muscle wasting.  Her chest is clear.  Abdominal exam shows no increase in girth.  No masses or tenderness to palpation are present.  Her liver is 10 cm in span without left lobe enlargement.  No spleen tip is palpable and extremity exam shows no edema.  Skin exam shows no stigmata of  chronic liver disease.  Neurologic exam shows no asterixis.     Recent Results (from the past 168 hour(s))   INR [[id number]]    Collection Time: 11/19/19  9:54 AM   Result Value Ref Range    INR 1.00 0.86 - 1.14   CBC with platelets [[id number]]    Collection Time: 11/19/19  9:54 AM   Result Value Ref Range    WBC 5.6 4.0 - 11.0 10e9/L    RBC Count 4.16 3.8 - 5.2 10e12/L    Hemoglobin 13.8 11.7 - 15.7 g/dL    Hematocrit 41.7 35.0 - 47.0 %     78 - 100 fl    MCH 33.2 (H) 26.5 - 33.0 pg    MCHC 33.1 31.5 - 36.5 g/dL    RDW 12.4 10.0 - 15.0 %    Platelet Count 113 (L) 150 - 450 10e9/L   Basic metabolic panel [LAB15]    Collection Time: 11/19/19  9:54 AM   Result Value Ref Range    Sodium 139 133 - 144 mmol/L    Potassium 4.5 3.4 - 5.3 mmol/L    Chloride 107 94 - 109 mmol/L    Carbon Dioxide 29 20 - 32 mmol/L    Anion Gap 3 3 - 14 mmol/L    Glucose 96 70 - 99 mg/dL    Urea Nitrogen 17 7 - 30 mg/dL    Creatinine 0.82 0.52 - 1.04 mg/dL    GFR Estimate 80 >60 mL/min/[1.73_m2]    GFR Estimate If Black >90 >60 mL/min/[1.73_m2]    Calcium 9.3 8.5 - 10.1 mg/dL   Hepatic Panel [LAB20]    Collection Time: 11/19/19  9:54 AM   Result Value Ref Range    Bilirubin Direct 0.2 0.0 - 0.2 mg/dL    Bilirubin Total 0.5 0.2 - 1.3 mg/dL    Albumin 4.3 3.4 - 5.0 g/dL    Protein Total 7.6 6.8 - 8.8 g/dL    Alkaline Phosphatase 107 40 - 150 U/L    ALT 25 0 - 50 U/L    AST 16 0 - 45 U/L      I did review her ultrasound which shows no mass lesions in the liver.  Her liver is mildly enlarged.  There is no evidence of ascites or splenomegaly.      IMPRESSION:  My impression is that Ms. Wong has cirrhosis caused by chronic hepatitis C.  She is now a sustained virologic responder to hepatitis C treatment and with that her liver tests are excellent.  We had previously made the decision not to do variceal screen and I will see her back in the clinic in 6 months for repeat blood work and chemistries.  She did turn down an offer for a flu shot.       Finally, she does need colorectal cancer screening which she is aware of and working with her local physician.      Thank you very much for allowing me to participate in the care of this patient.  If you have any questions regarding my recommendations, please do not hesitate to contact me.       David So MD      Professor of Medicine  [employer]      Executive Medical Director, Solid Organ Transplant Program  [facility]       Again, thank you for allowing me to participate in the care of your patient.      Sincerely,    David So MD

## 2019-11-19 NOTE — NURSING NOTE
Chief Complaint   Patient presents with     RECHECK     follow up     /76 (BP Location: Right arm, Patient Position: Sitting, Cuff Size: Adult Regular)   Pulse 72   Temp 99  F (37.2  C)   Wt 80.7 kg (178 lb)   SpO2 96%   BMI 31.04 kg/m        Lucio Holley, EMT

## 2019-11-19 NOTE — PROGRESS NOTES
I had the pleasure of seeing Parisa Bullard for followup in the Liver Clinic at the [facility] on 11/19/2019.  Ms. 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  She is a sustained virologic responder to hepatitis C treatment.      She says she feels very well at this point in time.  She denies any abdominal pain, itching or skin rash or fatigue.  She denies any increased abdominal girth or lower extremity edema.  She denies any fevers or chills, cough or shortness of breath.  She denies any nausea or vomiting, diarrhea or constipation.  Her appetite has been good and her weight has remained the same.  There have been no other new events since she was last seen.     Current Outpatient Medications   Medication     medical cannabis (Patient's own supply.  Not a prescription)     methyl salicylate liquid     omega 3 1000 MG CAPS     PREBIOTIC PRODUCT PO     Saccharomyces boulardii (PROBIOTIC) 250 MG CAPS     UNABLE TO FIND     UNABLE TO FIND     UNABLE TO FIND     No current facility-administered medications for this visit.      /76 (BP Location: Right arm, Patient Position: Sitting, Cuff Size: Adult Regular)   Pulse 72   Temp 99  F (37.2  C)   Wt 80.7 kg (178 lb)   SpO2 96%   BMI 31.04 kg/m      EXAMINATION:  In general, she does look well.  HEENT exam shows no scleral icterus or temporal muscle wasting.  Her chest is clear.  Abdominal exam shows no increase in girth.  No masses or tenderness to palpation are present.  Her liver is 10 cm in span without left lobe enlargement.  No spleen tip is palpable and extremity exam shows no edema.  Skin exam shows no stigmata of chronic liver disease.  Neurologic exam shows no asterixis.     Recent Results (from the past 168 hour(s))   INR [[id number]]    Collection Time: 11/19/19  9:54 AM   Result Value Ref Range    INR 1.00 0.86 - 1.14   CBC with platelets [[id number]]    Collection Time: 11/19/19  9:54 AM    Result Value Ref Range    WBC 5.6 4.0 - 11.0 10e9/L    RBC Count 4.16 3.8 - 5.2 10e12/L    Hemoglobin 13.8 11.7 - 15.7 g/dL    Hematocrit 41.7 35.0 - 47.0 %     78 - 100 fl    MCH 33.2 (H) 26.5 - 33.0 pg    MCHC 33.1 31.5 - 36.5 g/dL    RDW 12.4 10.0 - 15.0 %    Platelet Count 113 (L) 150 - 450 10e9/L   Basic metabolic panel [LAB15]    Collection Time: 11/19/19  9:54 AM   Result Value Ref Range    Sodium 139 133 - 144 mmol/L    Potassium 4.5 3.4 - 5.3 mmol/L    Chloride 107 94 - 109 mmol/L    Carbon Dioxide 29 20 - 32 mmol/L    Anion Gap 3 3 - 14 mmol/L    Glucose 96 70 - 99 mg/dL    Urea Nitrogen 17 7 - 30 mg/dL    Creatinine 0.82 0.52 - 1.04 mg/dL    GFR Estimate 80 >60 mL/min/[1.73_m2]    GFR Estimate If Black >90 >60 mL/min/[1.73_m2]    Calcium 9.3 8.5 - 10.1 mg/dL   Hepatic Panel [LAB20]    Collection Time: 11/19/19  9:54 AM   Result Value Ref Range    Bilirubin Direct 0.2 0.0 - 0.2 mg/dL    Bilirubin Total 0.5 0.2 - 1.3 mg/dL    Albumin 4.3 3.4 - 5.0 g/dL    Protein Total 7.6 6.8 - 8.8 g/dL    Alkaline Phosphatase 107 40 - 150 U/L    ALT 25 0 - 50 U/L    AST 16 0 - 45 U/L      I did review her ultrasound which shows no mass lesions in the liver.  Her liver is mildly enlarged.  There is no evidence of ascites or splenomegaly.      IMPRESSION:  My impression is that Ms. Wong has cirrhosis caused by chronic hepatitis C.  She is now a sustained virologic responder to hepatitis C treatment and with that her liver tests are excellent.  We had previously made the decision not to do variceal screen and I will see her back in the clinic in 6 months for repeat blood work and chemistries.  She did turn down an offer for a flu shot.      Finally, she does need colorectal cancer screening which she is aware of and working with her local physician.      Thank you very much for allowing me to participate in the care of this patient.  If you have any questions regarding my recommendations, please do not  hesitate to contact me.       David So MD      Professor of Medicine  [employer]      Executive Medical Director, Solid Organ Transplant Program  [facility]

## 2020-02-10 ENCOUNTER — HEALTH MAINTENANCE LETTER (OUTPATIENT)
Age: 55
End: 2020-02-10

## 2020-03-31 ENCOUNTER — DOCUMENTATION ONLY (OUTPATIENT)
Dept: CARE COORDINATION | Facility: CLINIC | Age: 55
End: 2020-03-31

## 2020-05-19 ENCOUNTER — HOSPITAL ENCOUNTER (OUTPATIENT)
Dept: ULTRASOUND IMAGING | Facility: CLINIC | Age: 55
Discharge: HOME | End: 2020-05-19
Attending: INTERNAL MEDICINE | Admitting: INTERNAL MEDICINE
Payer: COMMERCIAL

## 2020-05-19 ENCOUNTER — VIRTUAL VISIT (OUTPATIENT)
Dept: GASTROENTEROLOGY | Facility: CLINIC | Age: 55
End: 2020-05-19
Attending: INTERNAL MEDICINE
Payer: COMMERCIAL

## 2020-05-19 DIAGNOSIS — K74.60 CIRRHOSIS OF LIVER WITHOUT ASCITES, UNSPECIFIED HEPATIC CIRRHOSIS TYPE (H): Primary | ICD-10-CM

## 2020-05-19 DIAGNOSIS — K74.60 CIRRHOSIS OF LIVER WITHOUT ASCITES, UNSPECIFIED HEPATIC CIRRHOSIS TYPE (H): ICD-10-CM

## 2020-05-19 LAB
AFP SERPL-MCNC: 7.3 UG/L (ref 0–8)
ALBUMIN SERPL-MCNC: 4.1 G/DL (ref 3.4–5)
ALP SERPL-CCNC: 95 U/L (ref 40–150)
ALT SERPL W P-5'-P-CCNC: 23 U/L (ref 0–50)
ANION GAP SERPL CALCULATED.3IONS-SCNC: 4 MMOL/L (ref 3–14)
AST SERPL W P-5'-P-CCNC: 17 U/L (ref 0–45)
BILIRUB DIRECT SERPL-MCNC: 0.1 MG/DL (ref 0–0.2)
BILIRUB SERPL-MCNC: 0.6 MG/DL (ref 0.2–1.3)
BUN SERPL-MCNC: 15 MG/DL (ref 7–30)
CALCIUM SERPL-MCNC: 9 MG/DL (ref 8.5–10.1)
CHLORIDE SERPL-SCNC: 107 MMOL/L (ref 94–109)
CO2 SERPL-SCNC: 28 MMOL/L (ref 20–32)
CREAT SERPL-MCNC: 0.81 MG/DL (ref 0.52–1.04)
ERYTHROCYTE [DISTWIDTH] IN BLOOD BY AUTOMATED COUNT: 12.3 % (ref 10–15)
GFR SERPL CREATININE-BSD FRML MDRD: 81 ML/MIN/{1.73_M2}
GLUCOSE SERPL-MCNC: 100 MG/DL (ref 70–99)
HCT VFR BLD AUTO: 42.9 % (ref 35–47)
HGB BLD-MCNC: 14.3 G/DL (ref 11.7–15.7)
INR PPP: 1.07 (ref 0.86–1.14)
MCH RBC QN AUTO: 33.1 PG (ref 26.5–33)
MCHC RBC AUTO-ENTMCNC: 33.3 G/DL (ref 31.5–36.5)
MCV RBC AUTO: 99 FL (ref 78–100)
PLATELET # BLD AUTO: 115 10E9/L (ref 150–450)
POTASSIUM SERPL-SCNC: 3.9 MMOL/L (ref 3.4–5.3)
PROT SERPL-MCNC: 7.6 G/DL (ref 6.8–8.8)
RBC # BLD AUTO: 4.32 10E12/L (ref 3.8–5.2)
SODIUM SERPL-SCNC: 139 MMOL/L (ref 133–144)
WBC # BLD AUTO: 7.3 10E9/L (ref 4–11)

## 2020-05-19 PROCEDURE — 76700 US EXAM ABDOM COMPLETE: CPT

## 2020-05-19 PROCEDURE — 80076 HEPATIC FUNCTION PANEL: CPT | Performed by: INTERNAL MEDICINE

## 2020-05-19 PROCEDURE — 80048 BASIC METABOLIC PNL TOTAL CA: CPT | Performed by: INTERNAL MEDICINE

## 2020-05-19 PROCEDURE — 82105 ALPHA-FETOPROTEIN SERUM: CPT | Performed by: INTERNAL MEDICINE

## 2020-05-19 PROCEDURE — 85027 COMPLETE CBC AUTOMATED: CPT | Performed by: INTERNAL MEDICINE

## 2020-05-19 PROCEDURE — 85610 PROTHROMBIN TIME: CPT | Performed by: INTERNAL MEDICINE

## 2020-05-19 PROCEDURE — 36415 COLL VENOUS BLD VENIPUNCTURE: CPT | Performed by: INTERNAL MEDICINE

## 2020-05-19 NOTE — PROGRESS NOTES
"Parisa Wong is a 55 year old female who is being evaluated via a billable video visit.      The patient has been notified of following:     \"This video visit will be conducted via a call between you and your physician/provider. We have found that certain health care needs can be provided without the need for an in-person physical exam.  This service lets us provide the care you need with a video conversation.  If a prescription is necessary we can send it directly to your pharmacy.  If lab work is needed we can place an order for that and you can then stop by our lab to have the test done at a later time.    Video visits are billed at different rates depending on your insurance coverage.  Please reach out to your insurance provider with any questions.    If during the course of the call the physician/provider feels a video visit is not appropriate, you will not be charged for this service.\"    Patient has given verbal consent for Video visit? Yes    How would you like to obtain your AVS? Bryce    Patient would like the video invitation sent by: Send to e-mail at: [email or web address]    Will anyone else be joining your video visit? No        Video-Visit Details    I had the pleasure of seeing Parisa Bullard for followup in the Liver Clinic at the [facility] on 11/19/2019.  Ms. 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  She is a sustained virologic responder to hepatitis C treatment.      She says she feels very well at this point in time.  She denies any abdominal pain, itching or skin rash or fatigue.      She denies any increased abdominal girth or lower extremity edema.  She denies any fevers or chills, cough or shortness of breath.  She denies any nausea or vomiting, diarrhea or constipation.    She denies any gastrointestinal bleeding or any overt signs of hepatic encephalopathy.  Her appetite has been good and her weight has remained the " same.  There have been no other new events since she was last seen.     Current Outpatient Medications   Medication     medical cannabis (Patient's own supply.  Not a prescription)     methyl salicylate liquid     omega 3 1000 MG CAPS     Saccharomyces boulardii (PROBIOTIC) 250 MG CAPS     UNABLE TO FIND     UNABLE TO FIND     UNABLE TO FIND     PREBIOTIC PRODUCT PO     No current facility-administered medications for this visit.      On physical examination, she looks well.  HEENT exam shows no scleral icterus or temporal muscle wasting.  Neurologic exam is nonfocal and she has no asterixis.     Recent Results (from the past 168 hour(s))   INR [[id number]]    Collection Time: 05/19/20  7:57 AM   Result Value Ref Range    INR 1.07 0.86 - 1.14   CBC with platelets [[id number]]    Collection Time: 05/19/20  7:57 AM   Result Value Ref Range    WBC 7.3 4.0 - 11.0 10e9/L    RBC Count 4.32 3.8 - 5.2 10e12/L    Hemoglobin 14.3 11.7 - 15.7 g/dL    Hematocrit 42.9 35.0 - 47.0 %    MCV 99 78 - 100 fl    MCH 33.1 (H) 26.5 - 33.0 pg    MCHC 33.3 31.5 - 36.5 g/dL    RDW 12.3 10.0 - 15.0 %    Platelet Count 115 (L) 150 - 450 10e9/L   Basic metabolic panel [LAB15]    Collection Time: 05/19/20  7:57 AM   Result Value Ref Range    Sodium 139 133 - 144 mmol/L    Potassium 3.9 3.4 - 5.3 mmol/L    Chloride 107 94 - 109 mmol/L    Carbon Dioxide 28 20 - 32 mmol/L    Anion Gap 4 3 - 14 mmol/L    Glucose 100 (H) 70 - 99 mg/dL    Urea Nitrogen 15 7 - 30 mg/dL    Creatinine 0.81 0.52 - 1.04 mg/dL    GFR Estimate 81 >60 mL/min/[1.73_m2]    GFR Estimate If Black >90 >60 mL/min/[1.73_m2]    Calcium 9.0 8.5 - 10.1 mg/dL   Hepatic Panel [LAB20]    Collection Time: 05/19/20  7:57 AM   Result Value Ref Range    Bilirubin Direct 0.1 0.0 - 0.2 mg/dL    Bilirubin Total 0.6 0.2 - 1.3 mg/dL    Albumin 4.1 3.4 - 5.0 g/dL    Protein Total 7.6 6.8 - 8.8 g/dL    Alkaline Phosphatase 95 40 - 150 U/L    ALT 23 0 - 50 U/L    AST 17 0 - 45 U/L      She did have an  ultrasound exam in [place], which showed a cirrhotic appearing liver but no mass lesions or ascites.    My impression is that Ms. Wong has cirrhosis caused by chronic hepatitis C.  She is now a sustained virologic responder to hepatitis C treatment and with that her liver tests are excellent.  We had previously made the decision not to do variceal screen and I will see her back in the clinic in 6 months for repeat blood work and chemistries.    Finally, she does need colorectal cancer screening which she is aware of and working with her local physician.  She says this is the best that she has felt in years.     Thank you very much for allowing me to participate in the care of this patient.  If you have any questions regarding my recommendations, please do not hesitate to contact me.         David So MD      Professor of Medicine  [employer]      Executive Medical Director, Solid Organ Transplant Program  [facility]     Type of service:  Video Visit    Video Start Time: 10:30 AM  Video End Time: 10:45 AM    Originating Location (pt. Location):     Distant Location (provider location):  [facility] HEPATOLOGY     Platform used for Video Visit: Phone

## 2020-08-17 ENCOUNTER — OFFICE VISIT (OUTPATIENT)
Dept: FAMILY MEDICINE | Facility: CLINIC | Age: 55
End: 2020-08-17
Payer: COMMERCIAL

## 2020-08-17 VITALS
HEIGHT: 64 IN | WEIGHT: 176 LBS | SYSTOLIC BLOOD PRESSURE: 134 MMHG | HEART RATE: 84 BPM | DIASTOLIC BLOOD PRESSURE: 76 MMHG | RESPIRATION RATE: 16 BRPM | BODY MASS INDEX: 30.05 KG/M2 | OXYGEN SATURATION: 97 % | TEMPERATURE: 97.8 F

## 2020-08-17 DIAGNOSIS — B07.0 PLANTAR WARTS: Primary | ICD-10-CM

## 2020-08-17 DIAGNOSIS — Z12.11 SPECIAL SCREENING FOR MALIGNANT NEOPLASMS, COLON: ICD-10-CM

## 2020-08-17 PROCEDURE — 99207 ZZC NO BILLABLE SERVICE THIS VISIT: CPT | Performed by: NURSE PRACTITIONER

## 2020-08-17 PROCEDURE — 17110 DESTRUCTION B9 LES UP TO 14: CPT | Performed by: NURSE PRACTITIONER

## 2020-08-17 ASSESSMENT — ENCOUNTER SYMPTOMS: CONSTITUTIONAL NEGATIVE: 1

## 2020-08-17 ASSESSMENT — PAIN SCALES - GENERAL: PAINLEVEL: MILD PAIN (2)

## 2020-08-17 ASSESSMENT — MIFFLIN-ST. JEOR: SCORE: 1364.36

## 2020-08-17 NOTE — PROGRESS NOTES
Subjective     Parisa Wong is a 55 year old female who presents to clinic today for the following health issues:    HPI       Chief Complaint   Patient presents with     Derm Problem     Patient has a wart on the bottom of right foot. Patient has noticed it for the past 2 months and varies on discomfort each day. Patient rates pain currently at a 2-3/10. Patient has tried no at home treatment.      Health Maintenance     mammogram and fit       Additional provider notes: right bottom of the foot is painful. She states it looks like a callus but states someone told her it is a wart. Has picked at it a couple times and it feels better, then pain returns.       Patient Active Problem List   Diagnosis     GOITER UNINODULAR, NONTOXIC     ENLARGED UTERUS     CARDIOVASCULAR SCREENING; LDL GOAL LESS THAN 160     24 hour contact handout given     Memory difficulty     Elevated liver enzymes     Vitamin D deficiency     Colon polyps     Obese     Knee pain     SI (sacroiliac) joint inflammation (H)     Lumbar spinal stenosis     Thumb pain     Chronic hepatitis C without hepatic coma (H)     Degenerative arthritis of thumb     Past Surgical History:   Procedure Laterality Date     COLONOSCOPY  1/29/2014    Procedure: COMBINED COLONOSCOPY, SINGLE BIOPSY/POLYPECTOMY BY BIOPSY;  Gastroscopy;  Surgeon: Prashanth Lin MD;  Location: [facility]     D & C  6-     HC ABLATION, ENDOMETRIAL, THERMAL, W/O HYSTEROSCOPIC GUIDANCE  6-    abiel     SURGICAL HISTORY OF -   1979    tonsillectomy and adenoidectomy     SURGICAL HISTORY OF -       discectomy L5, S1       Social History     Tobacco Use     Smoking status: Former Smoker     Types: Cigars     Smokeless tobacco: Never Used     Tobacco comment: son and huband smoke in the house   Substance Use Topics     Alcohol use: Yes     Comment: occ non-alcoholic beer     Family History   Problem Relation Age of Onset     Cancer Maternal Grandmother         throat      "Alcohol/Drug Maternal Grandfather         alcohol     Neurologic Disorder Daughter         cerebral palsy     Gastrointestinal Disease Mother         Divertic     Gynecology Mother         Hysterectomy     Cancer Mother         Leukemia     Breast Cancer Maternal Aunt      Unknown/Adopted Father      Cancer - colorectal No family hx of      Prostate Cancer No family hx of      C.A.D. No family hx of          Current Outpatient Medications   Medication Sig Dispense Refill     medical cannabis (Patient's own supply.  Not a prescription) 1 capsule daily (This is NOT a prescription, and does not certify that the patient has a qualifying medical condition for medical cannabis.  The purpose of this order is  to document that the patient reports taking medical cannabis.)       methyl salicylate liquid Apply topically as needed for moderate to severe pain (Max Freeze)        omega 3 1000 MG CAPS        PREBIOTIC PRODUCT PO        Saccharomyces boulardii (PROBIOTIC) 250 MG CAPS        UNABLE TO FIND MEDICATION NAME: Protandim       UNABLE TO FIND MEDICATION NAME: Diatomaceous       UNABLE TO FIND MEDICATION NAME: Iodine, 3 drops on tongue once per day       Allergies   Allergen Reactions     Codeine      Sulfa Drugs      BP Readings from Last 3 Encounters:   08/17/20 134/76   11/19/19 112/76   05/21/19 129/87    Wt Readings from Last 3 Encounters:   08/17/20 79.8 kg (176 lb)   11/19/19 80.7 kg (178 lb)   05/21/19 80.3 kg (177 lb)                    Reviewed and updated as needed this visit by Provider  Tobacco  Allergies  Meds  Problems  Med Hx  Surg Hx  Fam Hx         Review of Systems   Constitutional: Negative.    Skin:        Right plantar foot pain with wart            Objective    /76   Pulse 84   Temp 97.8  F (36.6  C) (Tympanic)   Resp 16   Ht 1.619 m (5' 3.75\")   Wt 79.8 kg (176 lb)   SpO2 97%   Breastfeeding No   BMI 30.45 kg/m    Body mass index is 30.45 kg/m .  Physical Exam  Vitals signs and " "nursing note reviewed.   Constitutional:       General: She is not in acute distress.     Appearance: Normal appearance. She is not ill-appearing or toxic-appearing.   Musculoskeletal:        Feet:    Skin:     General: Skin is warm and dry.   Neurological:      Mental Status: She is alert.            Diagnostic Test Results:  Labs reviewed in Epic        Assessment & Plan     1. Plantar warts  Exam consistent with plantar wart. Discussed cryotherapy. She agreed with cryotherapy. Cryotherapy treatment performed. Education on what to expect and when to return for possible repeat treatment.     - DESTRUCT BENIGN LESION, UP TO 14    2. Special screening for malignant neoplasms, colon    - Fecal colorectal cancer screen (FIT); Future     BMI:   Estimated body mass index is 30.45 kg/m  as calculated from the following:    Height as of this encounter: 1.619 m (5' 3.75\").    Weight as of this encounter: 79.8 kg (176 lb).           Patient Instructions   Skin tag removal by cryotherapy competed today.  1. It will be red for several days.  2. Blister may form.  3. Repeat procedure may be necessary in 2-4 weeks if lesion(s) doesn't completely resolve.        Return if symptoms worsen or fail to improve.    [facility]  "

## 2020-09-03 DIAGNOSIS — Z12.11 SPECIAL SCREENING FOR MALIGNANT NEOPLASMS, COLON: ICD-10-CM

## 2020-09-04 PROCEDURE — 82274 ASSAY TEST FOR BLOOD FECAL: CPT | Performed by: NURSE PRACTITIONER

## 2020-09-09 LAB — HEMOCCULT STL QL IA: NEGATIVE

## 2020-10-22 ENCOUNTER — OFFICE VISIT (OUTPATIENT)
Dept: FAMILY MEDICINE | Facility: CLINIC | Age: 55
End: 2020-10-22
Payer: COMMERCIAL

## 2020-10-22 VITALS
RESPIRATION RATE: 12 BRPM | BODY MASS INDEX: 30.73 KG/M2 | HEIGHT: 64 IN | HEART RATE: 68 BPM | WEIGHT: 180 LBS | OXYGEN SATURATION: 97 % | SYSTOLIC BLOOD PRESSURE: 111 MMHG | DIASTOLIC BLOOD PRESSURE: 73 MMHG | TEMPERATURE: 98.3 F

## 2020-10-22 DIAGNOSIS — E55.9 VITAMIN D DEFICIENCY: ICD-10-CM

## 2020-10-22 DIAGNOSIS — Z78.0 POST-MENOPAUSAL: ICD-10-CM

## 2020-10-22 DIAGNOSIS — M51.369 DDD (DEGENERATIVE DISC DISEASE), LUMBAR: ICD-10-CM

## 2020-10-22 DIAGNOSIS — Z00.00 ROUTINE GENERAL MEDICAL EXAMINATION AT A HEALTH CARE FACILITY: Primary | ICD-10-CM

## 2020-10-22 DIAGNOSIS — E78.5 HYPERLIPIDEMIA LDL GOAL <130: ICD-10-CM

## 2020-10-22 LAB
ALBUMIN SERPL-MCNC: 4.4 G/DL (ref 3.4–5)
ALP SERPL-CCNC: 83 U/L (ref 40–150)
ALT SERPL W P-5'-P-CCNC: 22 U/L (ref 0–50)
ANION GAP SERPL CALCULATED.3IONS-SCNC: 5 MMOL/L (ref 3–14)
AST SERPL W P-5'-P-CCNC: 17 U/L (ref 0–45)
BILIRUB SERPL-MCNC: 0.7 MG/DL (ref 0.2–1.3)
BUN SERPL-MCNC: 17 MG/DL (ref 7–30)
CALCIUM SERPL-MCNC: 9.2 MG/DL (ref 8.5–10.1)
CHLORIDE SERPL-SCNC: 108 MMOL/L (ref 94–109)
CHOLEST SERPL-MCNC: 229 MG/DL
CO2 SERPL-SCNC: 27 MMOL/L (ref 20–32)
CREAT SERPL-MCNC: 0.79 MG/DL (ref 0.52–1.04)
GFR SERPL CREATININE-BSD FRML MDRD: 83 ML/MIN/{1.73_M2}
GLUCOSE SERPL-MCNC: 88 MG/DL (ref 70–99)
HDLC SERPL-MCNC: 92 MG/DL
LDLC SERPL CALC-MCNC: 120 MG/DL
NONHDLC SERPL-MCNC: 137 MG/DL
POTASSIUM SERPL-SCNC: 3.8 MMOL/L (ref 3.4–5.3)
PROT SERPL-MCNC: 7.5 G/DL (ref 6.8–8.8)
SODIUM SERPL-SCNC: 140 MMOL/L (ref 133–144)
TRIGL SERPL-MCNC: 85 MG/DL

## 2020-10-22 PROCEDURE — 82306 VITAMIN D 25 HYDROXY: CPT | Performed by: NURSE PRACTITIONER

## 2020-10-22 PROCEDURE — 80053 COMPREHEN METABOLIC PANEL: CPT | Performed by: NURSE PRACTITIONER

## 2020-10-22 PROCEDURE — 80061 LIPID PANEL: CPT | Performed by: NURSE PRACTITIONER

## 2020-10-22 PROCEDURE — 99396 PREV VISIT EST AGE 40-64: CPT | Performed by: NURSE PRACTITIONER

## 2020-10-22 ASSESSMENT — MIFFLIN-ST. JEOR: SCORE: 1382.5

## 2020-10-22 ASSESSMENT — PAIN SCALES - GENERAL: PAINLEVEL: NO PAIN (0)

## 2020-10-22 NOTE — PATIENT INSTRUCTIONS
Preventive Health Recommendations  Female Ages 50 - 64    Yearly exam: See your health care provider every year in order to  o Review health changes.   o Discuss preventive care.    o Review your medicines if your doctor has prescribed any.      Get a Pap test every three years (unless you have an abnormal result and your provider advises testing more often).    If you get Pap tests with HPV test, you only need to test every 5 years, unless you have an abnormal result.     You do not need a Pap test if your uterus was removed (hysterectomy) and you have not had cancer.    You should be tested each year for STDs (sexually transmitted diseases) if you're at risk.     Have a mammogram every 1 to 2 years.    Have a colonoscopy at age 50, or have a yearly FIT test (stool test). These exams screen for colon cancer.      Have a cholesterol test every 5 years, or more often if advised.    Have a diabetes test (fasting glucose) every three years. If you are at risk for diabetes, you should have this test more often.     If you are at risk for osteoporosis (brittle bone disease), think about having a bone density scan (DEXA).    Shots: Get a flu shot each year. Get a tetanus shot every 10 years.    Nutrition:     Eat at least 5 servings of fruits and vegetables each day.    Eat whole-grain bread, whole-wheat pasta and brown rice instead of white grains and rice.    Get adequate Calcium and Vitamin D.     Lifestyle    Exercise at least 150 minutes a week (30 minutes a day, 5 days a week). This will help you control your weight and prevent disease.    Limit alcohol to one drink per day.    No smoking.     Wear sunscreen to prevent skin cancer.     See your dentist every six months for an exam and cleaning.    See your eye doctor every 1 to 2 years.      Sorry for your  Loss,   It does get better but yes it is a journey     Stay well hydrated - urine should be clear.      For the wart you can get over the counter wart  treatment     Get your mammogram done.     Lab work will be sent to you

## 2020-10-22 NOTE — PROGRESS NOTES
SUBJECTIVE:   CC: Parisa Wong is an 55 year old woman who presents for preventive health visit.       Patient has been advised of split billing requirements and indicates understanding: Yes  Healthy Habits:    Do you get at least three servings of calcium containing foods daily (dairy, green leafy vegetables, etc.)? no, taking calcium and/or vitamin D supplement: yes     Amount of exercise or daily activities, outside of work: 1 day(s) per week    Problems taking medications regularly No    Medication side effects: No    Have you had an eye exam in the past two years? yes    Do you see a dentist twice per year? no    Do you have sleep apnea, excessive snoring or daytime drowsiness?no      Lost her son to a motorcycle accident July, 2020,   Had to  Put down 3  Dogs.    Her son was killed in a motorcycle accident in  June 2020.     Today's PHQ-2 Score:   PHQ-2 ( 1999 [employer]) 10/22/2020 8/17/2020   Q1: Little interest or pleasure in doing things 0 0   Q2: Feeling down, depressed or hopeless 1 0   PHQ-2 Score 1 0       Abuse: Current or Past(Physical, Sexual or Emotional)- No  Do you feel safe in your environment? Yes    Have you ever done Advance Care Planning? (For example, a Health Directive, POLST, or a discussion with a medical provider or your loved ones about your wishes): No, advance care planning information given to patient to review.  Advanced care planning was discussed at today's visit.    Social History     Tobacco Use     Smoking status: Former Smoker     Types: Cigars     Smokeless tobacco: Never Used     Tobacco comment: son and huband smoke in the house   Substance Use Topics     Alcohol use: Yes     Comment: occ non-alcoholic beer     If you drink alcohol do you typically have >3 drinks per day or >7 drinks per week? No                     Reviewed orders with patient.  Reviewed health maintenance and updated orders accordingly - Yes  BP Readings from Last 3 Encounters:   10/22/20 111/73    08/17/20 134/76   11/19/19 112/76    Wt Readings from Last 3 Encounters:   10/22/20 81.6 kg (180 lb)   08/17/20 79.8 kg (176 lb)   11/19/19 80.7 kg (178 lb)                  Patient Active Problem List   Diagnosis     GOITER UNINODULAR, NONTOXIC     ENLARGED UTERUS     CARDIOVASCULAR SCREENING; LDL GOAL LESS THAN 160     24 hour contact handout given     Memory difficulty     Elevated liver enzymes     Vitamin D deficiency     Colon polyps     Obese     Knee pain     SI (sacroiliac) joint inflammation (H)     Lumbar spinal stenosis     Thumb pain     Chronic hepatitis C without hepatic coma (H)     Degenerative arthritis of thumb     Past Surgical History:   Procedure Laterality Date     COLONOSCOPY  1/29/2014    Procedure: COMBINED COLONOSCOPY, SINGLE BIOPSY/POLYPECTOMY BY BIOPSY;  Gastroscopy;  Surgeon: Prashanth Lin MD;  Location: [facility]     D & C  6-     HC ABLATION, ENDOMETRIAL, THERMAL, W/O HYSTEROSCOPIC GUIDANCE  6-    novasure     SURGICAL HISTORY OF -   1979    tonsillectomy and adenoidectomy     SURGICAL HISTORY OF -       discectomy L5, S1       Social History     Tobacco Use     Smoking status: Former Smoker     Types: Cigars     Smokeless tobacco: Never Used     Tobacco comment: son and huband smoke in the house   Substance Use Topics     Alcohol use: Yes     Comment: occ non-alcoholic beer     Family History   Problem Relation Age of Onset     Cancer Maternal Grandmother         throat     Alcohol/Drug Maternal Grandfather         alcohol     Neurologic Disorder Daughter         cerebral palsy     Gastrointestinal Disease Mother         Divertic     Gynecology Mother         Hysterectomy     Cancer Mother         Leukemia     Breast Cancer Maternal Aunt      Unknown/Adopted Father      Cancer - colorectal No family hx of      Prostate Cancer No family hx of      C.A.D. No family hx of          Current Outpatient Medications   Medication Sig Dispense Refill     medical cannabis  (Patient's own supply.  Not a prescription) 1 capsule daily (This is NOT a prescription, and does not certify that the patient has a qualifying medical condition for medical cannabis.  The purpose of this order is  to document that the patient reports taking medical cannabis.)       methyl salicylate liquid Apply topically as needed for moderate to severe pain (Max Freeze)        omega 3 1000 MG CAPS        PREBIOTIC PRODUCT PO        Saccharomyces boulardii (PROBIOTIC) 250 MG CAPS        UNABLE TO FIND MEDICATION NAME: Protandim       UNABLE TO FIND MEDICATION NAME: Diatomaceous       UNABLE TO FIND MEDICATION NAME: Iodine, 3 drops on tongue once per day       Allergies   Allergen Reactions     Codeine      Sulfa Drugs        Mammogram Screening: Patient over age 50, mutual decision to screen reflected in health maintenance.    Pertinent mammograms are reviewed under the imaging tab.  History of abnormal Pap smear: NO - age 30- 65 PAP every 3 years recommended  PAP / HPV Latest Ref Rng & Units 4/22/2019 3/24/2015 4/4/2012   PAP - NIL NIL NIL   HPV 16 DNA NEG:Negative Negative Negative -   HPV 18 DNA NEG:Negative Negative Negative -   OTHER HR HPV NEG:Negative Negative Negative -     Reviewed and updated as needed this visit by clinical staff  Tobacco  Allergies  Meds   Med Hx  Surg Hx  Fam Hx  Soc Hx        Reviewed and updated as needed this visit by Provider                    ROS:  CONSTITUTIONAL:NEGATIVE for fever, chills,  and POSITIVE  for weight gain  INTEGUMENTARY/SKIN: NEGATIVE for worrisome rashes, moles or lesions and did have a planar wart removed   EYES: NEGATIVE for vision changes or irritation and current with eye exam and , wears cheaters   ENT: NEGATIVE for ear, mouth and throat problems, needs teeth cleaning   RESP:NEGATIVE for significant cough or SOB  BREAST: NEGATIVE for masses, tenderness or discharge  CV: NEGATIVE for chest pain, palpitations or peripheral edema  GI: NEGATIVE for  "nausea, abdominal pain, heartburn, or change in bowel habits and occasional reflux   : NEGATIVE for unusual urinary or vaginal symptoms. No vaginal bleeding.  MUSCULOSKELETAL: NEGATIVE for significant arthralgias or myalgia  NEURO: NEGATIVE for weakness, dizziness or paresthesias  ENDOCRINE: NEGATIVE for temperature intolerance, skin/hair changes  HEME/ALLERGY/IMMUNE: NEGATIVE for bleeding problems  PSYCHIATRIC: NEGATIVE for changes in mood or affect and is dealing with her son's death .       OBJECTIVE:   /73 (BP Location: Right arm, Patient Position: Chair, Cuff Size: Adult Large)   Pulse 68   Temp 98.3  F (36.8  C) (Tympanic)   Resp 12   Ht 1.619 m (5' 3.75\")   Wt 81.6 kg (180 lb)   LMP  (LMP Unknown)   SpO2 97%   Breastfeeding No   BMI 31.14 kg/m    EXAM:  GENERAL APPEARANCE: healthy, alert and no distress  EYES: Eyes grossly normal to inspection, PERRL and conjunctivae and sclerae normal  HENT: ear canals and TM's normal, nose and mouth without ulcers or lesions, oropharynx clear and oral mucous membranes moist  NECK: no adenopathy, no asymmetry, masses, or scars and thyroid normal to palpation  RESP: lungs clear to auscultation - no rales, rhonchi or wheezes  BREAST: normal without masses, tenderness or nipple discharge and no palpable axillary masses or adenopathy  CV: regular rate and rhythm, normal S1 S2, no S3 or S4, no murmur, click or rub, no peripheral edema and peripheral pulses strong  ABDOMEN: soft, nontender, no hepatosplenomegaly, no masses and bowel sounds normal  MS: no musculoskeletal defects are noted and gait is age appropriate without ataxia  SKIN: no suspicious lesions or rashes  NEURO: Normal strength and tone, sensory exam grossly normal, mentation intact and speech normal  PSYCH: mentation appears normal and affect normal/bright    Diagnostic Test Results:  Labs reviewed in Epic      ASSESSMENT/PLAN:     ASSESSMENT/PLAN:      ICD-10-CM    1. Routine general medical " examination at a health care facility  Z00.00 Comprehensive metabolic panel   2. Post-menopausal  Z78.0 DX Hip/Pelvis/Spine     Comprehensive metabolic panel   3. DDD (degenerative disc disease), lumbar  M51.36 DX Hip/Pelvis/Spine   4. Hyperlipidemia LDL goal <130  E78.5 Lipid panel reflex to direct LDL Fasting   5. Vitamin D deficiency  E55.9 Vitamin D Deficiency       Patient Instructions     Preventive Health Recommendations  Female Ages 50 - 64    Yearly exam: See your health care provider every year in order to  o Review health changes.   o Discuss preventive care.    o Review your medicines if your doctor has prescribed any.      Get a Pap test every three years (unless you have an abnormal result and your provider advises testing more often).    If you get Pap tests with HPV test, you only need to test every 5 years, unless you have an abnormal result.     You do not need a Pap test if your uterus was removed (hysterectomy) and you have not had cancer.    You should be tested each year for STDs (sexually transmitted diseases) if you're at risk.     Have a mammogram every 1 to 2 years.    Have a colonoscopy at age 50, or have a yearly FIT test (stool test). These exams screen for colon cancer.      Have a cholesterol test every 5 years, or more often if advised.    Have a diabetes test (fasting glucose) every three years. If you are at risk for diabetes, you should have this test more often.     If you are at risk for osteoporosis (brittle bone disease), think about having a bone density scan (DEXA).    Shots: Get a flu shot each year. Get a tetanus shot every 10 years.    Nutrition:     Eat at least 5 servings of fruits and vegetables each day.    Eat whole-grain bread, whole-wheat pasta and brown rice instead of white grains and rice.    Get adequate Calcium and Vitamin D.     Lifestyle    Exercise at least 150 minutes a week (30 minutes a day, 5 days a week). This will help you control your weight and  "prevent disease.    Limit alcohol to one drink per day.    No smoking.     Wear sunscreen to prevent skin cancer.     See your dentist every six months for an exam and cleaning.    See your eye doctor every 1 to 2 years.      Sorry for your  Loss,   It does get better but yes it is a journey     Stay well hydrated - urine should be clear.      For the wart you can get over the counter wart treatment     Get your mammogram done.     Lab work will be sent to you               Patient has been advised of split billing requirements and indicates understanding: Yes  COUNSELING:   Reviewed preventive health counseling, as reflected in patient instructions       Regular exercise       Healthy diet/nutrition       Vision screening       Hearing screening    Estimated body mass index is 31.14 kg/m  as calculated from the following:    Height as of this encounter: 1.619 m (5' 3.75\").    Weight as of this encounter: 81.6 kg (180 lb).    Weight management plan: Patient was referred to their PCP to discuss a diet and exercise plan.    She reports that she has quit smoking. Her smoking use included cigars. She has never used smokeless tobacco.      Counseling Resources:  ATP IV Guidelines  Pooled Cohorts Equation Calculator  Breast Cancer Risk Calculator  BRCA-Related Cancer Risk Assessment: FHS-7 Tool  FRAX Risk Assessment  ICSI Preventive Guidelines  Dietary Guidelines for Americans, 2010  USDA's MyPlate  ASA Prophylaxis  Lung CA Screening    ANGE CARPENTER NP, NELIA MEDELLIN  [facility]  "

## 2020-10-23 LAB — DEPRECATED CALCIDIOL+CALCIFEROL SERPL-MC: 40 UG/L (ref 20–75)

## 2020-11-12 ENCOUNTER — HOSPITAL ENCOUNTER (OUTPATIENT)
Dept: MAMMOGRAPHY | Facility: CLINIC | Age: 55
Discharge: HOME | End: 2020-11-12
Attending: NURSE PRACTITIONER | Admitting: NURSE PRACTITIONER
Payer: COMMERCIAL

## 2020-11-12 DIAGNOSIS — Z12.31 VISIT FOR SCREENING MAMMOGRAM: ICD-10-CM

## 2020-11-12 PROCEDURE — 77067 SCR MAMMO BI INCL CAD: CPT

## 2020-11-16 ENCOUNTER — HEALTH MAINTENANCE LETTER (OUTPATIENT)
Age: 55
End: 2020-11-16

## 2020-11-19 ENCOUNTER — HOSPITAL ENCOUNTER (OUTPATIENT)
Dept: BONE DENSITY | Facility: CLINIC | Age: 55
Discharge: HOME | End: 2020-11-19
Attending: NURSE PRACTITIONER | Admitting: NURSE PRACTITIONER
Payer: COMMERCIAL

## 2020-11-19 DIAGNOSIS — Z78.0 POST-MENOPAUSAL: ICD-10-CM

## 2020-11-19 DIAGNOSIS — M51.369 DDD (DEGENERATIVE DISC DISEASE), LUMBAR: ICD-10-CM

## 2020-11-19 PROCEDURE — 77080 DXA BONE DENSITY AXIAL: CPT

## 2020-11-25 ENCOUNTER — TELEPHONE (OUTPATIENT)
Dept: FAMILY MEDICINE | Facility: CLINIC | Age: 55
End: 2020-11-25

## 2020-11-25 NOTE — TELEPHONE ENCOUNTER
Left message on personal answering machine to return call. Direct line provided.  Sarah Jones RN

## 2020-11-25 NOTE — TELEPHONE ENCOUNTER
Reason for Call:  Other     Detailed comments: LEFT MESSAGE .  She states she's looking for results but not sure what results she is looking for.  Please call and assess. Thank you..Winter Cole    Phone Number Patient can be reached at: Home number on file [phone] (home)      Call taken on 11/25/2020 at 1:48 PM by Winter Cole

## 2020-12-11 ENCOUNTER — TELEPHONE (OUTPATIENT)
Dept: FAMILY MEDICINE | Facility: CLINIC | Age: 55
End: 2020-12-11

## 2020-12-11 NOTE — TELEPHONE ENCOUNTER
Reason for Call:  Request for results:    Name of test or procedure: DEXA scan     Date of test of procedure: 11/19/20    Location of the test or procedure: [place]     OK to leave the result message on voice mail or with a family member? NO    Phone number Patient can be reached at:  Home number on file [phone] (home)      Call taken on 12/11/2020 at 12:20 PM by Stacey Goldstein

## 2020-12-15 NOTE — TELEPHONE ENCOUNTER
Called pt on Sunday and reviewed her DEXA scan.     Did review with her that she will need to establish care with a new provider due to my senior care

## 2021-09-12 ENCOUNTER — HEALTH MAINTENANCE LETTER (OUTPATIENT)
Age: 56
End: 2021-09-12

## 2021-09-27 ENCOUNTER — OFFICE VISIT (OUTPATIENT)
Dept: FAMILY MEDICINE | Facility: CLINIC | Age: 56
End: 2021-09-27
Payer: COMMERCIAL

## 2021-09-27 VITALS
DIASTOLIC BLOOD PRESSURE: 70 MMHG | SYSTOLIC BLOOD PRESSURE: 100 MMHG | TEMPERATURE: 97.6 F | WEIGHT: 187 LBS | HEIGHT: 63 IN | OXYGEN SATURATION: 98 % | HEART RATE: 69 BPM | BODY MASS INDEX: 33.13 KG/M2 | RESPIRATION RATE: 16 BRPM

## 2021-09-27 DIAGNOSIS — Z86.0100 HISTORY OF COLONIC POLYPS: ICD-10-CM

## 2021-09-27 DIAGNOSIS — Z12.11 SPECIAL SCREENING FOR MALIGNANT NEOPLASMS, COLON: Primary | ICD-10-CM

## 2021-09-27 PROCEDURE — 99213 OFFICE O/P EST LOW 20 MIN: CPT | Performed by: NURSE PRACTITIONER

## 2021-09-27 ASSESSMENT — MIFFLIN-ST. JEOR: SCORE: 1401.32

## 2021-09-27 NOTE — PROGRESS NOTES
"    Assessment & Plan           Special screening for malignant neoplasms, colon  - Adult Gastro Ref - Procedure Only; Future    History of colonic polyps    - Adult Gastro Ref - Procedure Only; Future      Patient declined influenza vaccine today       BMI:   Estimated body mass index is 32.86 kg/m  as calculated from the following:    Height as of this encounter: 1.607 m (5' 3.25\").    Weight as of this encounter: 84.8 kg (187 lb).   Weight management plan: Discussed healthy diet and exercise guidelines        No follow-ups on file.    NELIA Oconnell CNP  [facility] HospitalVIVIANE Mccauley is a 56 year old who presents for the following health issues     HPI     New Patient/Transfer of Care: [email or web address] care- previous provider retired.   Medical chart was reviewed with patient .     Bleeding from nose, bleeding from rectum and phlegm in July for 1-2 days and then gone.       New Patient/Transfer of Care    Review of Systems   Constitutional, HEENT, cardiovascular, pulmonary, GI, , musculoskeletal, neuro, skin, endocrine and psych systems are negative, except as otherwise noted.      Objective    /70 (BP Location: Left arm, Patient Position: Chair, Cuff Size: Adult Regular)   Pulse 69   Temp 97.6  F (36.4  C) (Tympanic)   Resp 16   Ht 1.607 m (5' 3.25\")   Wt 84.8 kg (187 lb)   LMP  (LMP Unknown)   SpO2 98%   BMI 32.86 kg/m    Body mass index is 32.86 kg/m .  Physical Exam   GENERAL: healthy, alert and no distress  EYES: Eyes grossly normal to inspection, PERRL and conjunctivae and sclerae normal  HENT: ear canals and TM's normal, nose and mouth without ulcers or lesions  NECK: no adenopathy, no asymmetry, masses, or scars and thyroid normal to palpation  RESP: lungs clear to auscultation - no rales, rhonchi or wheezes  CV: regular rate and rhythm, normal S1 S2, no S3 or S4, no murmur, click or rub, no peripheral edema and peripheral pulses strong  ABDOMEN: soft, nontender, no " hepatosplenomegaly, no masses and bowel sounds normal  MS: no gross musculoskeletal defects noted, no edema  SKIN: no suspicious lesions or rashes  NEURO: Normal strength and tone, mentation intact and speech normal  PSYCH: mentation appears normal, affect normal/bright

## 2022-01-02 ENCOUNTER — HEALTH MAINTENANCE LETTER (OUTPATIENT)
Age: 57
End: 2022-01-02

## 2022-11-19 ENCOUNTER — HEALTH MAINTENANCE LETTER (OUTPATIENT)
Age: 57
End: 2022-11-19

## 2023-04-09 ENCOUNTER — HEALTH MAINTENANCE LETTER (OUTPATIENT)
Age: 58
End: 2023-04-09

## 2024-06-16 ENCOUNTER — HEALTH MAINTENANCE LETTER (OUTPATIENT)
Age: 59
End: 2024-06-16